# Patient Record
Sex: MALE | Race: WHITE | NOT HISPANIC OR LATINO | URBAN - METROPOLITAN AREA
[De-identification: names, ages, dates, MRNs, and addresses within clinical notes are randomized per-mention and may not be internally consistent; named-entity substitution may affect disease eponyms.]

---

## 2018-05-24 ENCOUNTER — HISTORICAL (OUTPATIENT)
Dept: ADMINISTRATIVE | Facility: HOSPITAL | Age: 68
End: 2018-05-24

## 2018-05-24 LAB
ABS NEUT (OLG): 3.4
ALBUMIN SERPL-MCNC: 4.6 GM/DL (ref 3.4–5)
ALBUMIN/GLOB SERPL: 2.42 {RATIO} (ref 1.5–2.5)
ALP SERPL-CCNC: 86 UNIT/L (ref 38–126)
ALT SERPL-CCNC: 24 UNIT/L (ref 7–52)
APPEARANCE, UA: CLEAR
AST SERPL-CCNC: 21 UNIT/L (ref 15–37)
BACTERIA #/AREA URNS AUTO: NORMAL /HPF
BILIRUB SERPL-MCNC: 0.6 MG/DL (ref 0.2–1)
BILIRUB UR QL STRIP: NEGATIVE MG/DL
BILIRUBIN DIRECT+TOT PNL SERPL-MCNC: 0.1 MG/DL (ref 0–0.5)
BILIRUBIN DIRECT+TOT PNL SERPL-MCNC: 0.5 MG/DL
BUN SERPL-MCNC: 12 MG/DL (ref 7–18)
CALCIUM SERPL-MCNC: 8.9 MG/DL (ref 8.5–10)
CHLORIDE SERPL-SCNC: 103 MMOL/L (ref 98–107)
CHOLEST SERPL-MCNC: 227 MG/DL (ref 0–200)
CHOLEST/HDLC SERPL: 3.4 {RATIO}
CO2 SERPL-SCNC: 30 MMOL/L (ref 21–32)
COLOR UR: YELLOW
CREAT SERPL-MCNC: 0.95 MG/DL (ref 0.6–1.3)
ERYTHROCYTE [DISTWIDTH] IN BLOOD BY AUTOMATED COUNT: 13.4 % (ref 11.5–17)
GLOBULIN SER-MCNC: 2 GM/DL (ref 1.2–3)
GLUCOSE (UA): NEGATIVE MG/DL
GLUCOSE SERPL-MCNC: 108 MG/DL (ref 74–106)
HCT VFR BLD AUTO: 44.6 % (ref 42–52)
HDLC SERPL-MCNC: 66 MG/DL (ref 35–60)
HGB BLD-MCNC: 14.3 GM/DL (ref 14–18)
HGB UR QL STRIP: NEGATIVE UNIT/L
KETONES UR QL STRIP: NEGATIVE MG/DL
LDLC SERPL CALC-MCNC: 129 MG/DL (ref 0–129)
LEUKOCYTE ESTERASE UR QL STRIP: NEGATIVE UNIT/L
LYMPHOCYTES # BLD AUTO: 3.3 X10(3)/MCL (ref 0.6–3.4)
LYMPHOCYTES NFR BLD AUTO: 46 % (ref 13–40)
MCH RBC QN AUTO: 28.6 PG (ref 27–31.2)
MCHC RBC AUTO-ENTMCNC: 32 GM/DL (ref 32–36)
MCV RBC AUTO: 89 FL (ref 80–94)
MONOCYTES # BLD AUTO: 0.4 X10(3)/MCL (ref 0–1.8)
MONOCYTES NFR BLD AUTO: 5 % (ref 0.1–24)
NEUTROPHILS NFR BLD AUTO: 49 % (ref 47–80)
NITRITE UR QL STRIP.AUTO: NEGATIVE
PH UR STRIP: 7 [PH]
PLATELET # BLD AUTO: 205 X10(3)/MCL (ref 130–400)
PMV BLD AUTO: 11.1 FL
POTASSIUM SERPL-SCNC: 4.9 MMOL/L (ref 3.5–5.1)
PROT SERPL-MCNC: 6.5 GM/DL (ref 6.4–8.2)
PROT UR QL STRIP: NEGATIVE MG/DL
PSA SERPL-MCNC: 2.8 NG/ML (ref 0–4.5)
RBC # BLD AUTO: 5 X10(6)/MCL (ref 4.7–6.1)
RBC #/AREA URNS HPF: NORMAL /HPF
SODIUM SERPL-SCNC: 139 MMOL/L (ref 136–145)
SP GR UR STRIP: 1.01
SQUAMOUS EPITHELIAL, UA: NORMAL /LPF
TRIGL SERPL-MCNC: 81 MG/DL (ref 30–150)
UROBILINOGEN UR STRIP-ACNC: 0.2 MG/DL
VLDLC SERPL CALC-MCNC: 16.2 MG/DL
WBC # SPEC AUTO: 7.1 X10(3)/MCL (ref 4.5–11.5)
WBC #/AREA URNS AUTO: NORMAL /[HPF]

## 2019-07-16 ENCOUNTER — HISTORICAL (OUTPATIENT)
Dept: ADMINISTRATIVE | Facility: HOSPITAL | Age: 69
End: 2019-07-16

## 2019-07-16 LAB
ABS NEUT (OLG): 4.1 X10(3)/MCL (ref 2.1–9.2)
ALBUMIN SERPL-MCNC: 4.2 GM/DL (ref 3.4–5)
ALBUMIN/GLOB SERPL: 2.33 {RATIO} (ref 1.5–2.5)
ALP SERPL-CCNC: 69 UNIT/L (ref 38–126)
ALT SERPL-CCNC: 29 UNIT/L (ref 7–52)
APPEARANCE, UA: CLEAR
AST SERPL-CCNC: 22 UNIT/L (ref 15–37)
BACTERIA #/AREA URNS AUTO: NORMAL /HPF
BILIRUB SERPL-MCNC: 0.5 MG/DL (ref 0.2–1)
BILIRUB UR QL STRIP: NEGATIVE MG/DL
BILIRUBIN DIRECT+TOT PNL SERPL-MCNC: 0.1 MG/DL (ref 0–0.5)
BILIRUBIN DIRECT+TOT PNL SERPL-MCNC: 0.4 MG/DL
BUN SERPL-MCNC: 16 MG/DL (ref 7–18)
CALCIUM SERPL-MCNC: 8.8 MG/DL (ref 8.5–10)
CHLORIDE SERPL-SCNC: 103 MMOL/L (ref 98–107)
CHOLEST SERPL-MCNC: 209 MG/DL (ref 0–200)
CHOLEST/HDLC SERPL: 4.3 {RATIO}
CO2 SERPL-SCNC: 29 MMOL/L (ref 21–32)
COLOR UR: YELLOW
CREAT SERPL-MCNC: 1.09 MG/DL (ref 0.6–1.3)
ERYTHROCYTE [DISTWIDTH] IN BLOOD BY AUTOMATED COUNT: 14.2 % (ref 11.5–17)
GLOBULIN SER-MCNC: 1.8 GM/DL (ref 1.2–3)
GLUCOSE (UA): NEGATIVE MG/DL
GLUCOSE SERPL-MCNC: 107 MG/DL (ref 74–106)
HCT VFR BLD AUTO: 48.3 % (ref 42–52)
HDLC SERPL-MCNC: 49 MG/DL (ref 35–60)
HGB BLD-MCNC: 15.7 GM/DL (ref 14–18)
HGB UR QL STRIP: NEGATIVE UNIT/L
KETONES UR QL STRIP: NORMAL MG/DL
LDLC SERPL CALC-MCNC: 133 MG/DL (ref 0–129)
LEUKOCYTE ESTERASE UR QL STRIP: NEGATIVE UNIT/L
LYMPHOCYTES # BLD AUTO: 3.9 X10(3)/MCL (ref 0.6–3.4)
LYMPHOCYTES NFR BLD AUTO: 44.3 % (ref 13–40)
MCH RBC QN AUTO: 28.4 PG (ref 27–31.2)
MCHC RBC AUTO-ENTMCNC: 32 GM/DL (ref 32–36)
MCV RBC AUTO: 88 FL (ref 80–94)
MONOCYTES # BLD AUTO: 0.7 X10(3)/MCL (ref 0.1–1.3)
MONOCYTES NFR BLD AUTO: 8.2 % (ref 0.1–24)
NEUTROPHILS NFR BLD AUTO: 47.5 % (ref 47–80)
NITRITE UR QL STRIP.AUTO: NEGATIVE
PH UR STRIP: 6 [PH]
PLATELET # BLD AUTO: 196 X10(3)/MCL (ref 130–400)
PMV BLD AUTO: 10.6 FL (ref 9.4–12.4)
POTASSIUM SERPL-SCNC: 4.9 MMOL/L (ref 3.5–5.1)
PROT SERPL-MCNC: 6 GM/DL (ref 6.4–8.2)
PROT UR QL STRIP: NEGATIVE MG/DL
PSA SERPL-MCNC: 3.52 NG/ML (ref 0–6.5)
RBC # BLD AUTO: 5.52 X10(6)/MCL (ref 4.7–6.1)
RBC #/AREA URNS HPF: NORMAL /HPF
SODIUM SERPL-SCNC: 137 MMOL/L (ref 136–145)
SP GR UR STRIP: 1.01
SQUAMOUS EPITHELIAL, UA: NORMAL /LPF
TRIGL SERPL-MCNC: 61 MG/DL (ref 30–150)
UROBILINOGEN UR STRIP-ACNC: 0.2 MG/DL
VLDLC SERPL CALC-MCNC: 12.2 MG/DL
WBC # SPEC AUTO: 8.7 X10(3)/MCL (ref 4.5–11.5)
WBC #/AREA URNS AUTO: NORMAL /[HPF]

## 2020-07-07 ENCOUNTER — HISTORICAL (OUTPATIENT)
Dept: ADMINISTRATIVE | Facility: HOSPITAL | Age: 70
End: 2020-07-07

## 2020-07-07 LAB
ABS NEUT (OLG): 4 X10(3)/MCL (ref 2.1–9.2)
ALBUMIN SERPL-MCNC: 4.4 GM/DL (ref 3.4–5)
ALBUMIN/GLOB SERPL: 2.32 {RATIO} (ref 1.5–2.5)
ALP SERPL-CCNC: 65 UNIT/L (ref 38–126)
ALT SERPL-CCNC: 20 UNIT/L (ref 7–52)
APPEARANCE, UA: CLEAR
AST SERPL-CCNC: 22 UNIT/L (ref 15–37)
BACTERIA #/AREA URNS AUTO: ABNORMAL /HPF
BILIRUB SERPL-MCNC: 0.5 MG/DL (ref 0.2–1)
BILIRUB UR QL STRIP: NEGATIVE MG/DL
BILIRUBIN DIRECT+TOT PNL SERPL-MCNC: 0.1 MG/DL (ref 0–0.5)
BILIRUBIN DIRECT+TOT PNL SERPL-MCNC: 0.4 MG/DL
BUN SERPL-MCNC: 14 MG/DL (ref 7–18)
CALCIUM SERPL-MCNC: 9.6 MG/DL (ref 8.5–10)
CHLORIDE SERPL-SCNC: 102 MMOL/L (ref 98–107)
CHOLEST SERPL-MCNC: 223 MG/DL (ref 0–200)
CHOLEST/HDLC SERPL: 3.9 {RATIO}
CO2 SERPL-SCNC: 29 MMOL/L (ref 21–32)
COLOR UR: YELLOW
CREAT SERPL-MCNC: 1.08 MG/DL (ref 0.6–1.3)
ERYTHROCYTE [DISTWIDTH] IN BLOOD BY AUTOMATED COUNT: 13.8 % (ref 11.5–17)
GLOBULIN SER-MCNC: 1.9 GM/DL (ref 1.2–3)
GLUCOSE (UA): NEGATIVE MG/DL
GLUCOSE SERPL-MCNC: 100 MG/DL (ref 74–106)
HCT VFR BLD AUTO: 48.2 % (ref 42–52)
HDLC SERPL-MCNC: 57 MG/DL (ref 35–60)
HGB BLD-MCNC: 15.6 GM/DL (ref 14–18)
HGB UR QL STRIP: NEGATIVE UNIT/L
KETONES UR QL STRIP: NEGATIVE MG/DL
LDLC SERPL CALC-MCNC: 133 MG/DL (ref 0–129)
LEUKOCYTE ESTERASE UR QL STRIP: NEGATIVE UNIT/L
LYMPHOCYTES # BLD AUTO: 3.6 X10(3)/MCL (ref 0.6–3.4)
LYMPHOCYTES NFR BLD AUTO: 44 % (ref 13–40)
MCH RBC QN AUTO: 28.2 PG (ref 27–31.2)
MCHC RBC AUTO-ENTMCNC: 32 GM/DL (ref 32–36)
MCV RBC AUTO: 87 FL (ref 80–94)
MONOCYTES # BLD AUTO: 0.5 X10(3)/MCL (ref 0.1–1.3)
MONOCYTES NFR BLD AUTO: 6 % (ref 0.1–24)
NEUTROPHILS NFR BLD AUTO: 50 % (ref 47–80)
NITRITE UR QL STRIP.AUTO: NEGATIVE
PH UR STRIP: 7 [PH]
PLATELET # BLD AUTO: 207 X10(3)/MCL (ref 130–400)
PMV BLD AUTO: 10.4 FL (ref 9.4–12.4)
POTASSIUM SERPL-SCNC: 5.3 MMOL/L (ref 3.5–5.1)
PROT SERPL-MCNC: 6.3 GM/DL (ref 6.4–8.2)
PROT UR QL STRIP: ABNORMAL MG/DL
PSA SERPL-MCNC: 5.48 NG/ML (ref 0–6.5)
RBC # BLD AUTO: 5.53 X10(6)/MCL (ref 4.7–6.1)
RBC #/AREA URNS HPF: ABNORMAL /HPF
SODIUM SERPL-SCNC: 139 MMOL/L (ref 136–145)
SP GR UR STRIP: 1.02
SQUAMOUS EPITHELIAL, UA: ABNORMAL /LPF
T3FREE SERPL-MCNC: 3.41 PG/ML (ref 1.45–3.48)
T4 FREE SERPL-MCNC: 0.94 NG/DL (ref 0.76–1.46)
TRIGL SERPL-MCNC: 69 MG/DL (ref 30–150)
TSH SERPL-ACNC: 3.8 MIU/ML (ref 0.35–4.94)
UROBILINOGEN UR STRIP-ACNC: 0.2 MG/DL
VLDLC SERPL CALC-MCNC: 13.8 MG/DL
WBC # SPEC AUTO: 8.1 X10(3)/MCL (ref 4.5–11.5)
WBC #/AREA URNS AUTO: ABNORMAL /[HPF]

## 2020-10-15 ENCOUNTER — HISTORICAL (OUTPATIENT)
Dept: ADMINISTRATIVE | Facility: HOSPITAL | Age: 70
End: 2020-10-15

## 2020-10-15 LAB
APPEARANCE, UA: CLEAR
BACTERIA #/AREA URNS AUTO: ABNORMAL /HPF
BILIRUB UR QL STRIP: NEGATIVE MG/DL
BUN SERPL-MCNC: 20 MG/DL (ref 7–18)
CALCIUM SERPL-MCNC: 9.9 MG/DL (ref 8.5–10.1)
CHLORIDE SERPL-SCNC: 102 MMOL/L (ref 98–107)
CO2 SERPL-SCNC: 29 MMOL/L (ref 21–32)
COLOR UR: YELLOW
CREAT SERPL-MCNC: 1.13 MG/DL (ref 0.6–1.3)
CREAT UR-MCNC: 276.1 MG/DL (ref 58–161)
CREAT/UREA NIT SERPL: 17.7
GLUCOSE (UA): NEGATIVE MG/DL
GLUCOSE SERPL-MCNC: 95 MG/DL (ref 74–106)
HGB UR QL STRIP: NEGATIVE UNIT/L
KETONES UR QL STRIP: ABNORMAL MG/DL
LEUKOCYTE ESTERASE UR QL STRIP: NEGATIVE UNIT/L
NITRITE UR QL STRIP.AUTO: NEGATIVE
PH UR STRIP: 5.5 [PH]
POTASSIUM SERPL-SCNC: 6.4 MMOL/L (ref 3.5–5.1)
PROT UR QL STRIP: ABNORMAL MG/DL
PROT UR STRIP-MCNC: 22.5 MG/DL
PROT/CREAT UR-RTO: 0 MG/DL
PSA SERPL-MCNC: 3.43 NG/ML (ref 0–6.5)
RBC #/AREA URNS HPF: ABNORMAL /HPF
SODIUM SERPL-SCNC: 140 MMOL/L (ref 136–145)
SP GR UR STRIP: 1.02
SQUAMOUS EPITHELIAL, UA: ABNORMAL /LPF
UROBILINOGEN UR STRIP-ACNC: 0.2 MG/DL
WBC #/AREA URNS AUTO: ABNORMAL /[HPF]

## 2020-10-28 ENCOUNTER — HISTORICAL (OUTPATIENT)
Dept: ADMINISTRATIVE | Facility: HOSPITAL | Age: 70
End: 2020-10-28

## 2020-10-28 LAB — POTASSIUM SERPL-SCNC: 5.6 MMOL/L (ref 3.5–5.1)

## 2021-07-16 ENCOUNTER — HISTORICAL (OUTPATIENT)
Dept: ADMINISTRATIVE | Facility: HOSPITAL | Age: 71
End: 2021-07-16

## 2021-07-16 LAB
ABS NEUT (OLG): 4.5 X10(3)/MCL (ref 2.1–9.2)
ALBUMIN SERPL-MCNC: 4.3 GM/DL (ref 3.4–5)
ALBUMIN/GLOB SERPL: 2.39 {RATIO} (ref 1.5–2.5)
ALP SERPL-CCNC: 73 UNIT/L (ref 38–126)
ALT SERPL-CCNC: 17 UNIT/L (ref 7–52)
APPEARANCE, UA: CLEAR
AST SERPL-CCNC: 18 UNIT/L (ref 15–37)
BACTERIA #/AREA URNS AUTO: ABNORMAL /HPF
BILIRUB SERPL-MCNC: 0.5 MG/DL (ref 0.2–1)
BILIRUB UR QL STRIP: NEGATIVE MG/DL
BILIRUBIN DIRECT+TOT PNL SERPL-MCNC: 0.1 MG/DL (ref 0–0.5)
BILIRUBIN DIRECT+TOT PNL SERPL-MCNC: 0.4 MG/DL
BUN SERPL-MCNC: 14 MG/DL (ref 7–18)
CALCIUM SERPL-MCNC: 9.2 MG/DL (ref 8.5–10.1)
CHLORIDE SERPL-SCNC: 100 MMOL/L (ref 98–107)
CHOLEST SERPL-MCNC: 163 MG/DL (ref 0–200)
CHOLEST/HDLC SERPL: 3.1 {RATIO}
CO2 SERPL-SCNC: 31 MMOL/L (ref 21–32)
COLOR UR: YELLOW
CREAT SERPL-MCNC: 0.95 MG/DL (ref 0.6–1.3)
ERYTHROCYTE [DISTWIDTH] IN BLOOD BY AUTOMATED COUNT: 13.9 % (ref 11.5–17)
GLOBULIN SER-MCNC: 1.8 GM/DL (ref 1.2–3)
GLUCOSE (UA): NEGATIVE MG/DL
GLUCOSE SERPL-MCNC: 91 MG/DL (ref 74–106)
HCT VFR BLD AUTO: 46.1 % (ref 42–52)
HDLC SERPL-MCNC: 52 MG/DL (ref 35–60)
HGB BLD-MCNC: 14.6 GM/DL (ref 14–18)
HGB UR QL STRIP: NEGATIVE UNIT/L
KETONES UR QL STRIP: ABNORMAL MG/DL
LDLC SERPL CALC-MCNC: 97 MG/DL (ref 0–129)
LEUKOCYTE ESTERASE UR QL STRIP: NEGATIVE UNIT/L
LYMPHOCYTES # BLD AUTO: 3.9 X10(3)/MCL (ref 0.6–3.4)
LYMPHOCYTES NFR BLD AUTO: 44.5 % (ref 13–40)
MCH RBC QN AUTO: 28.2 PG (ref 27–31.2)
MCHC RBC AUTO-ENTMCNC: 32 GM/DL (ref 32–36)
MCV RBC AUTO: 89 FL (ref 80–94)
MONOCYTES # BLD AUTO: 0.3 X10(3)/MCL (ref 0.1–1.3)
MONOCYTES NFR BLD AUTO: 3 % (ref 0.1–24)
NEUTROPHILS NFR BLD AUTO: 52.5 % (ref 47–80)
NITRITE UR QL STRIP.AUTO: NEGATIVE
PH UR STRIP: 6 [PH]
PLATELET # BLD AUTO: 206 X10(3)/MCL (ref 130–400)
PMV BLD AUTO: 11.2 FL (ref 9.4–12.4)
POTASSIUM SERPL-SCNC: 4.8 MMOL/L (ref 3.5–5.1)
PROT SERPL-MCNC: 6.1 GM/DL (ref 6.4–8.2)
PROT UR QL STRIP: NEGATIVE MG/DL
PSA SERPL-MCNC: 4.89 NG/ML (ref 0–6.5)
RBC # BLD AUTO: 5.18 X10(6)/MCL (ref 4.7–6.1)
RBC #/AREA URNS HPF: ABNORMAL /HPF
SODIUM SERPL-SCNC: 137 MMOL/L (ref 136–145)
SP GR UR STRIP: 1.02
SQUAMOUS EPITHELIAL, UA: ABNORMAL /LPF
TRIGL SERPL-MCNC: 58 MG/DL (ref 30–150)
UROBILINOGEN UR STRIP-ACNC: 0.2 MG/DL
VLDLC SERPL CALC-MCNC: 11.6 MG/DL
WBC # SPEC AUTO: 8.7 X10(3)/MCL (ref 4.5–11.5)
WBC #/AREA URNS AUTO: ABNORMAL /[HPF]

## 2021-07-20 LAB — EST CREAT CLEARANCE SER (OHS): 63.33 ML/MIN

## 2022-01-12 ENCOUNTER — HISTORICAL (OUTPATIENT)
Dept: ADMINISTRATIVE | Facility: HOSPITAL | Age: 72
End: 2022-01-12

## 2022-01-12 LAB
CHOLEST SERPL-MCNC: 238 MG/DL (ref 0–200)
CHOLEST/HDLC SERPL: 3.2 {RATIO}
HDLC SERPL-MCNC: 75 MG/DL (ref 35–60)
LDLC SERPL CALC-MCNC: 129 MG/DL (ref 0–129)
PSA SERPL-MCNC: 3.42 NG/ML (ref 0–6.5)
TRIGL SERPL-MCNC: 70 MG/DL (ref 30–150)
VLDLC SERPL CALC-MCNC: 14 MG/DL

## 2022-01-20 ENCOUNTER — HISTORICAL (OUTPATIENT)
Dept: ADMINISTRATIVE | Facility: HOSPITAL | Age: 72
End: 2022-01-20

## 2022-01-20 LAB
ABS NEUT (OLG): 5.1 X10(3)/MCL (ref 2.1–9.2)
CRP SERPL-MCNC: <0.1 MG/DL
ERYTHROCYTE [DISTWIDTH] IN BLOOD BY AUTOMATED COUNT: 14.3 % (ref 11.5–17)
ERYTHROCYTE [SEDIMENTATION RATE] IN BLOOD: 2 MM/HR (ref 0–15)
HCT VFR BLD AUTO: 42 % (ref 42–52)
HGB BLD-MCNC: 13.5 GM/DL (ref 14–18)
LYMPHOCYTES # BLD AUTO: 3.8 X10(3)/MCL (ref 0.6–3.4)
LYMPHOCYTES NFR BLD AUTO: 40.9 % (ref 13–40)
MCH RBC QN AUTO: 28.4 PG (ref 27–31.2)
MCHC RBC AUTO-ENTMCNC: 32 GM/DL (ref 32–36)
MCV RBC AUTO: 88 FL (ref 80–94)
MONOCYTES # BLD AUTO: 0.5 X10(3)/MCL (ref 0.1–1.3)
MONOCYTES NFR BLD AUTO: 5.8 % (ref 0.1–24)
NEUTROPHILS NFR BLD AUTO: 53.3 % (ref 47–80)
PLATELET # BLD AUTO: 229 X10(3)/MCL (ref 130–400)
PMV BLD AUTO: 10.3 FL (ref 9.4–12.4)
RBC # BLD AUTO: 4.76 X10(6)/MCL (ref 4.7–6.1)
WBC # SPEC AUTO: 9.4 X10(3)/MCL (ref 4.5–11.5)

## 2022-04-10 ENCOUNTER — HISTORICAL (OUTPATIENT)
Dept: ADMINISTRATIVE | Facility: HOSPITAL | Age: 72
End: 2022-04-10

## 2022-04-26 VITALS
BODY MASS INDEX: 19.98 KG/M2 | WEIGHT: 147.5 LBS | OXYGEN SATURATION: 96 % | DIASTOLIC BLOOD PRESSURE: 68 MMHG | SYSTOLIC BLOOD PRESSURE: 118 MMHG | HEIGHT: 72 IN

## 2022-05-02 NOTE — HISTORICAL [facility]
This is a historical note converted from [place]. Formatting and pictures may have been removed.  Please reference [place] for original formatting and attached multimedia. Chief Complaint  See scanned document titled: [facility] Recommendations  History of Present Illness  Patient presents for?work-up requested by?Dorinda?Deanna?at Dr Rhodes. Pt was diagnosed with retinal artery branch occlusion.  He went to the eye doctor for his normal check up. Otherwise his eye is healthy. He some loss of vision over his superior peripheral vision.  No headaches, dizziness or lightheadedness.  He denies chest pain/pressure/tightness/dyspnea.  He has been in his normal state health.  ?  His wife did a scan today and is cancer free.  Review of Systems  See HPI.  Physical Exam  Vitals & Measurements  T:?36.6? ?C (Oral)? HR:?79(Peripheral)? BP:?118/68? SpO2:?96%?  HT:?182.00?cm? WT:?66.900?kg? BMI:?20.2?  General: He is a well-developed well-nourished pleasant white male in no apparent distress. ?He is alert and oriented.  Chest: Clear to auscultation bilaterally.  CV: Regular rate and rhythm without murmurs rubs or gallops.  Neck: Supple,?no carotid bruits noted.  Assessment/Plan  1.?Retinal artery occlusion, branch?H34.239  ?Per Dr. Huerta recommendation?for work-up of this condition, we will order a?CBC sed rate?and C-reactive protein.  Recent lipid profile and PSA reviewed with patient.  Schedule?carotid Doppler.  Schedule MRI brain/orbits with and without.  Ordered:  Automated Diff, Routine collect, 01/20/22 15:47:00 CST, Blood, Collected, Stop date 01/20/22 15:47:00 CST, Lab Collect, Retinal artery occlusion, branch, 01/20/22 15:47:00 CST  CBC w/ Auto Diff, Routine collect, 01/20/22 15:47:00 CST, Blood, Stop date 01/20/22 15:47:00 CST, Lab Collect, Retinal artery occlusion, branch, 01/20/22 15:47:00 CST  Clinic Follow-up PRN, 01/20/22 15:42:00 CST, [facility], Future Order  CRP, Routine collect, 01/20/22 15:33:00  CST, Blood, Order for future visit, Stop date 01/20/22 15:33:00 CST, Lab Collect, Retinal artery occlusion, branch, 01/20/22 15:33:00 CST  Office/Outpatient Visit Level 3 Established [id number] PC, Retinal artery occlusion, branch, [facility], 01/20/22 15:41:00 CST  Schedule Diagnostics Study, Bilateral carotid Doppler, Next available. Please schedule at same time as MRI, 01/20/22 15:48:00 CST, Retinal artery occlusion, branch  Schedule Diagnostics Study, MRI brain/orbits with and without, Next available. Schedule at same time as carotid Doppler., 01/20/22 15:48:00 CST, Retinal artery occlusion, branch  Sedimentation Rate, Routine collect, 01/20/22 15:33:00 CST, Blood, Order for future visit, Stop date 01/20/22 15:33:00 CST, Lab Collect, Retinal artery occlusion, branch, 01/20/22 15:33:00 CST  ?  Referrals  Clinic Follow-up PRN, 01/20/22 15:42:00 CST, [facility], Future Order   Problem List/Past Medical History  Ongoing  Asthma  Chronic anemia  Dupuytrens contracture of left hand  Historical  Asthma  Mild chronic anemia  RBBB - Right bundle branch block  Procedure/Surgical History  Colonoscopy (2016)  Bunion  Bunionectomy  Dupluytrens Contracture  Dupuytren operation  Repair of umbilical hernia  Unbilical Hernia  Vascetomy  Vasectomy   Medications  No active medications  Allergies  No Known Allergies  Social History  Abuse/Neglect  No, 07/16/2020  Alcohol  Never, 05/14/2018  Employment/School  Retired, 05/14/2018  Exercise  Exercise type: Yardwork., 05/14/2018  Home/Environment  Lives with Spouse., 05/14/2018  Nutrition/Health  Low carbohydrate, Intermittent Fasting, 07/16/2020  Substance Use  Never, 05/30/2018  Tobacco  Never (less than 100 in lifetime), N/A, 07/16/2020  Family History  Coronary artery disease: Father.  Heart murmur.: Brother.  Hypercholesterolaemia: Mother.  Hypertension.: Brother.  Primary malignant neoplasm of breast: Mother.  Primary malignant neoplasm of prostate: Father and  Brother.  Stroke: Father.  Thyroid disease: Mother and Brother.  Immunizations  Vaccine Date Status   tetanus/diphtheria/pertussis, acel(Tdap) 07/20/2021 Given   influenza virus vaccine, inactivated 12/05/2016 Recorded   pneumococcal 23-polyvalent vaccine 05/24/2016 Recorded   pneumococcal 23-polyvalent vaccine 05/24/2016 Recorded   pneumococcal 13-valent conjugate vaccine 05/15/2014 Recorded   pneumococcal 13-valent conjugate vaccine 05/15/2014 Recorded   zoster vaccine live 03/16/2012 Recorded   zoster vaccine live 03/06/2012 Recorded   tetanus/diphtheria/pertussis, acel(Tdap) 07/31/2007 Recorded   diphtheria/pertussis, acellular/tetanus 07/31/2007 Recorded   pneumococcal 23-polyvalent vaccine 2006 Recorded   Health Maintenance  Health Maintenance  ???Pending?(in the next year)  ??? ??OverDue  ??? ? ? ?Influenza Vaccine due??10/01/21??and every 1??day(s)  ??? ? ? ?Advance Directive due??01/02/22??and every 1??year(s)  ??? ? ? ?Cognitive Screening due??01/02/22??and every 1??year(s)  ??? ? ? ?Fall Risk Assessment due??01/02/22??and every 1??year(s)  ??? ? ? ?Functional Assessment due??01/02/22??and every 1??year(s)  ??? ??Due?  ??? ? ? ?Alcohol Misuse Screening due??01/02/22??and every 1??year(s)  ??? ? ? ?ADL Screening due??01/20/22??and every 1??year(s)  ??? ? ? ?Aspirin Therapy for CVD Prevention due??01/20/22??and every 1??year(s)  ??? ? ? ?Asthma Management-Asthma Education due??01/20/22??and every 6??month(s)  ??? ? ? ?Asthma Management-Spirometry due??01/20/22??Variable frequency  ??? ? ? ?Asthma Management-[place] Flow due??01/20/22??Variable frequency  ??? ? ? ?Asthma Management-Written Action Plan due??01/20/22??and every 6??month(s)  ??? ? ? ?Depression Screening due??01/20/22??Unknown Frequency  ??? ? ? ?Zoster Vaccine due??01/20/22??Unknown Frequency  ??? ??Due In Future?  ??? ? ? ?Medicare Annual Wellness Exam not due until??07/20/22??and every 1??year(s)  ??? ? ? ?Obesity Screening not due  until??01/01/23??and every 1??year(s)  ???Satisfied?(in the past 1 year)  ??? ??Satisfied?  ??? ? ? ?Blood Pressure Screening on??01/20/22.??Satisfied by Estelita Gibbs LPN  ??? ? ? ?Body Mass Index Check on??01/20/22.??Satisfied by Estelita Gibbs LPN  ??? ? ? ?Diabetes Screening on??07/16/21.??Satisfied by Donnie Bates  ??? ? ? ?Influenza Vaccine on??01/20/22.??Satisfied by Estelita Gibbs LPN  ??? ? ? ?Lipid Screening on??01/12/22.??Satisfied by Donnie Bates  ??? ? ? ?Medicare Annual Wellness Exam on??07/20/21.??Satisfied by Miki Cadena MD  ??? ? ? ?Obesity Screening on??01/20/22.??Satisfied by Estelita Gibbs LPN  ??? ? ? ?Tetanus Vaccine on??07/20/21.??Satisfied by Dejan Nichole LPN  ?

## 2022-05-02 NOTE — HISTORICAL [facility]
This is a historical note converted from [place]. Formatting and pictures may have been removed.  Please reference [place] for original formatting and attached multimedia. Chief Complaint  Annual wellness physical- NPO @ time of labwork.  History of Present Illness  Patient presents for wellness examination.  He has been feeling very well.  He has been sleeping very well.  His appetite is good; he intermittent fasts. He eats more fats. He eats greens, nuts, eggs and cheeses. He has been eating less meat.  He does yard work and is active.  He is retired and lives with his spouse. His?spouse has a recurrence of her?Breast Cancer. She had a bilateral mastectomy and chemotherapy in 2014.? 3 children and 5 grandchildren.  He does not smoke or drink.  He has?1-2 cups of coffee a day. No soft drinks.  Colonoscopy 2016: Dr Boyd.  His father and brother both had prostate cancer.  Review of Systems  Constitutional:? no?weight gain,??no?weight loss,??no?fatigue, ?no?fever, ?no?chills, ?no?weakness, ?no?trouble sleeping  Eyes: ?no?vision loss/changes,??+?glasses,??no?pain,??no?redness,??no?blurry or double vision,??no?flashing lights,??no?glaucoma,??no?cataracts  Last eye exam:? yearly  Neck: ?no?lymphadenopathy,??no?thyroid abnormalities,??no?bruits,??no?stiffness  Ears:??no?decreased hearing,??no?tinnitus,??no?earache,??no?drainage?  Nose:??no?congestion,??no?rhinorrhea,??no?epistaxis,??no?sinus pressure  Throat/Oral:??no?sore throat,??no?hoarseness, ?no?dental caries,??no?gum bleeding,??no?oral lesions  Cardiovascular:??no?chest pain, palpitations, or tightness,??no?dyspnea with exertion,??no?orthopnea,??no?paroxysmal nocturnal dyspnea  Respiratory:??no?cough,??no?sputum,??no?hemoptysis,??no?dyspnea,??no?wheezing,? + asthma,?no?pleuritic chest pain?  Gastrointestinal:??no?abdominal pain,??no?nausea,??no?vomiting,??no?heartburn,??no?dysphagia or odynophagia,??no?diarrhea,??no?constipation,?his stool are float,  ?no?melena,??no?hematochezia,?no?jaundice  Urinary:??no?frequency,??no?urgency,??no?burning or pain,?no?hematuria,??no?incontinence,??no?hesitancy,??no?incomplete voiding,??no?flank pain,??no?nocturia  Musculoskeletal:?no?myalgias,??no?arthralgias,?no?neck pain,??no?back pain,??no?swelling of extremities, + Dupuytrens contracture left hand: much improved  Skin:?no?rashes,??no?sores,??no?non-healing wounds, abrasion mid forehead  Neurologic:??no?headaches,??no?dizziness/lightheadedness,??no?tremors,??no?paresthesias,??no?seizures,??no?muscle weakness  Psychiatric:??no?depression/sadness,??no?anhedonia,??no?irritability,??no?suicidal ideations,??no?anxiety,??no?panic attacks  Endocrine:??no?hot or cold intolerance,??no?sweating,??no?polyuria,??no?polydipsia,??no?polyphagia  Hematologic:??no?bruising,??no?bleeding disorders, + anemia  Physical Exam  Vitals & Measurements  T:?36.5? ?C (Oral)? HR:?77(Peripheral)? BP:?122/60? SpO2:?98%?  HT:?182.00?cm? WT:?63.700?kg? BMI:?19.23?  ?  GENERAL: The patient is a well-developed, well-nourished white male in no?apparent distress. He is alert and oriented x 4.  HEENT: Head is normocephalic and atraumatic. Extraocular muscles are intact. Pupils are equal, round, and reactive to light and accommodation. Nares appeared normal. Mouth is well hydrated and without lesions. Mucous membranes are moist. Posterior pharynx clear of any exudate or lesions.  NECK: Supple. No carotid bruits.? No lymphadenopathy or thyromegaly.  LUNGS: Clear to auscultation.  HEART: Regular rate and rhythm without murmur, gallops or rubs.  ABDOMEN: Soft, nontender, and nondistended.? Positive bowel sounds.? No hepatosplenomegaly was noted.  EXTREMITIES: Without any cyanosis, clubbing, rash, lesions or edema.  NEUROLOGIC: Cranial nerves II through XII are grossly intact.? No motor or sensory deficits.? Cerebellar function intact.  SKIN: No ulceration or induration present.  :? Normal male genitalia, no  hernias,?testes?descended, soft and atrophied.??NST,??prostate firm, smooth, prominent ridges bilaterally?with?slightly?raised area?mid right lobe.?  ?  ?  Assessment/Plan  1.?Wellness examination?Z00.00  ?Patient presents for wellness examination.  He has been feeling well.  He is without complaints or concerns.  Labs reviewed with patient.? His PSA is currently 4.89?last year was 3.53. ?His prostate exam?reveals bilateral ridging?with a slight raised area?mid right?prostate?area.  I?advised patient I would like to recheck his PSA in 6 months; patient is in agreement.? Order placed in chart.  Ordered:  Medicare Annual Wellness- Subsequent  PC, Wellness examination  Chronic anemia  Asthma  Immunization due  Family history of heart disease, [facility], 07/20/21 12:17:00 CDT  ?  2.?Chronic anemia?D64.9  ?CBC is within normal limits.  Ordered:  Medicare Annual Wellness- Subsequent  PC, Wellness examination  Chronic anemia  Asthma  Immunization due  Family history of heart disease, [facility], 07/20/21 12:17:00 CDT  ?  3.?Asthma?J45.909  Well-controlled; no medications?required.  Ordered:  Medicare Annual Wellness- Subsequent  PC, Wellness examination  Chronic anemia  Asthma  Immunization due  Family history of heart disease, [facility], 07/20/21 12:17:00 CDT  ?  4.?Immunization due?Z23  Tdap?0.5 IM administered; benefits/potential risks/side effects discussed with patient  Ordered:  Medicare Annual Wellness- Subsequent  PC, Wellness examination  Chronic anemia  Asthma  Immunization due  Family history of heart disease, [facility], 07/20/21 12:17:00 CDT  ?  5.?Family history of heart disease?Z82.49  His EKG reveals?normal sinus rhythm?with?left axis?deviation?and an incomplete right bundle branch block.  Ordered:  Medicare Annual Wellness- Subsequent  PC, Wellness examination  Chronic anemia  Asthma  Immunization due  Family history of heart disease, [facility]  [facility] - [facility], 07/20/21 12:17:00 CDT  ?  Orders:  Prostate Specific Antigen, Routine collect, *Est. 01/20/22 3:00:00 CST, Blood, Order for future visit, *Est. Stop date 01/20/22 3:00:00 CST, Lab Collect, Elevated PSA, 07/20/21 10:16:00 CDT  Referrals  Clinic Follow up, Order for future visit   Problem List/Past Medical History  Ongoing  Asthma  Chronic anemia  Dupuytrens contracture of left hand  Historical  Asthma  Mild chronic anemia  RBBB - Right bundle branch block  Procedure/Surgical History  Colonoscopy (2016)  Bunion  Bunionectomy  Dupluytrens Contracture  Dupuytren operation  Repair of umbilical hernia  Unbilical Hernia  Vascetomy  Vasectomy   Medications  No active medications  Allergies  No Known Allergies  Social History  Abuse/Neglect  No, 07/16/2020  Alcohol  Never, 05/14/2018  Employment/School  Retired, 05/14/2018  Exercise  Exercise type: Yardwork., 05/14/2018  Home/Environment  Lives with Spouse., 05/14/2018  Nutrition/Health  Low carbohydrate, Intermittent Fasting, 07/16/2020  Substance Use  Never, 05/30/2018  Tobacco  Never (less than 100 in lifetime), N/A, 07/16/2020  Family History  Coronary artery disease: Father.  Heart murmur.: Brother.  Hypercholesterolaemia: Mother.  Hypertension.: Brother.  Primary malignant neoplasm of breast: Mother.  Primary malignant neoplasm of prostate: Father and Brother.  Stroke: Father.  Thyroid disease: Mother and Brother.  Immunizations  Vaccine Date Status   tetanus/diphtheria/pertussis, acel(Tdap) 07/20/2021 Given   influenza virus vaccine, inactivated 12/05/2016 Recorded   pneumococcal 23-polyvalent vaccine 05/24/2016 Recorded   pneumococcal 23-polyvalent vaccine 05/24/2016 Recorded   pneumococcal 13-valent conjugate vaccine 05/15/2014 Recorded   pneumococcal 13-valent conjugate vaccine 05/15/2014 Recorded   zoster vaccine live 03/16/2012 Recorded   zoster vaccine live 03/06/2012 Recorded   tetanus/diphtheria/pertussis, acel(Tdap) 07/31/2007 Recorded    diphtheria/pertussis, acellular/tetanus 07/31/2007 Recorded   pneumococcal 23-polyvalent vaccine 2006 Recorded   Health Maintenance  Health Maintenance  ???Pending?(in the next year)  ??? ??OverDue  ??? ? ? ?Influenza Vaccine due??10/01/20??and every 1??day(s)  ??? ? ? ?Advance Directive due??01/02/21??and every 1??year(s)  ??? ? ? ?Alcohol Misuse Screening due??01/02/21??and every 1??year(s)  ??? ? ? ?Cognitive Screening due??01/02/21??and every 1??year(s)  ??? ? ? ?Fall Risk Assessment due??01/02/21??and every 1??year(s)  ??? ? ? ?Functional Assessment due??01/02/21??and every 1??year(s)  ??? ??Due?  ??? ? ? ?ADL Screening due??07/20/21??and every 1??year(s)  ??? ? ? ?Aspirin Therapy for CVD Prevention due??07/20/21??and every 1??year(s)  ??? ? ? ?Asthma Management-Asthma Education due??07/20/21??and every 6??month(s)  ??? ? ? ?Asthma Management-Spirometry due??07/20/21??Variable frequency  ??? ? ? ?Asthma Management-[place] Flow due??07/20/21??Variable frequency  ??? ? ? ?Asthma Management-Written Action Plan due??07/20/21??and every 6??month(s)  ??? ? ? ?Depression Screening due??07/20/21??Unknown Frequency  ??? ? ? ?Zoster Vaccine due??07/20/21??Unknown Frequency  ??? ??Due In Future?  ??? ? ? ?Obesity Screening not due until??01/01/22??and every 1??year(s)  ???Satisfied?(in the past 1 year)  ??? ??Satisfied?  ??? ? ? ?Blood Pressure Screening on??07/20/21.??Satisfied by Estelita Gibbs LPN  ??? ? ? ?Body Mass Index Check on??07/20/21.??Satisfied by Estelita Gibbs LPN  ??? ? ? ?Diabetes Screening on??07/16/21.??Satisfied by Donnie Bates  ??? ? ? ?Lipid Screening on??07/16/21.??Satisfied by Donnie Bates  ??? ? ? ?Medicare Annual Wellness Exam on??07/20/21.??Satisfied by Miki Cadena MD  ??? ? ? ?Obesity Screening on??07/20/21.??Satisfied by Estelita Gibbs LPN  ??? ? ? ?Tetanus Vaccine on??07/20/21.??Satisfied by Dejan Nichole LPN  ?

## 2022-07-20 PROBLEM — Z00.00 MEDICARE ANNUAL WELLNESS VISIT, SUBSEQUENT: Status: ACTIVE | Noted: 2022-07-20

## 2022-10-24 PROBLEM — Z00.00 MEDICARE ANNUAL WELLNESS VISIT, SUBSEQUENT: Status: RESOLVED | Noted: 2022-07-20 | Resolved: 2022-10-24

## 2023-08-23 ENCOUNTER — HOSPITAL ENCOUNTER (EMERGENCY)
Facility: HOSPITAL | Age: 73
Discharge: HOME | End: 2023-08-24
Attending: STUDENT IN AN ORGANIZED HEALTH CARE EDUCATION/TRAINING PROGRAM
Payer: MEDICARE

## 2023-08-23 VITALS
HEIGHT: 72 IN | SYSTOLIC BLOOD PRESSURE: 144 MMHG | OXYGEN SATURATION: 100 % | TEMPERATURE: 98 F | WEIGHT: 144 LBS | RESPIRATION RATE: 16 BRPM | BODY MASS INDEX: 19.5 KG/M2 | HEART RATE: 61 BPM | DIASTOLIC BLOOD PRESSURE: 82 MMHG

## 2023-08-23 DIAGNOSIS — I49.3 PVC (PREMATURE VENTRICULAR CONTRACTION): Primary | ICD-10-CM

## 2023-08-23 DIAGNOSIS — R07.9 CHEST PAIN: ICD-10-CM

## 2023-08-23 PROCEDURE — 0240U COVID/FLU A&B PCR: CPT | Performed by: STUDENT IN AN ORGANIZED HEALTH CARE EDUCATION/TRAINING PROGRAM

## 2023-08-23 PROCEDURE — 85025 COMPLETE CBC W/AUTO DIFF WBC: CPT | Performed by: STUDENT IN AN ORGANIZED HEALTH CARE EDUCATION/TRAINING PROGRAM

## 2023-08-23 PROCEDURE — 99285 EMERGENCY DEPT VISIT HI MDM: CPT | Mod: 25

## 2023-08-23 PROCEDURE — 93010 ELECTROCARDIOGRAM REPORT: CPT | Mod: ,,, | Performed by: INTERNAL MEDICINE

## 2023-08-23 PROCEDURE — 84484 ASSAY OF TROPONIN QUANT: CPT

## 2023-08-23 PROCEDURE — 80053 COMPREHEN METABOLIC PANEL: CPT | Performed by: STUDENT IN AN ORGANIZED HEALTH CARE EDUCATION/TRAINING PROGRAM

## 2023-08-23 PROCEDURE — 93010 EKG 12-LEAD: ICD-10-PCS | Mod: ,,, | Performed by: INTERNAL MEDICINE

## 2023-08-23 PROCEDURE — 93005 ELECTROCARDIOGRAM TRACING: CPT

## 2023-08-24 LAB
ALBUMIN SERPL-MCNC: 4 G/DL (ref 3.4–4.8)
ALBUMIN/GLOB SERPL: 1.5 RATIO (ref 1.1–2)
ALP SERPL-CCNC: 82 UNIT/L (ref 40–150)
ALT SERPL-CCNC: 27 UNIT/L (ref 0–55)
AST SERPL-CCNC: 23 UNIT/L (ref 5–34)
BASOPHILS # BLD AUTO: 0.03 X10(3)/MCL
BASOPHILS NFR BLD AUTO: 0.4 %
BILIRUB SERPL-MCNC: 0.2 MG/DL
BUN SERPL-MCNC: 28.1 MG/DL (ref 8.4–25.7)
CALCIUM SERPL-MCNC: 9.7 MG/DL (ref 8.8–10)
CHLORIDE SERPL-SCNC: 103 MMOL/L (ref 98–107)
CO2 SERPL-SCNC: 26 MMOL/L (ref 23–31)
CREAT SERPL-MCNC: 1.21 MG/DL (ref 0.73–1.18)
EOSINOPHIL # BLD AUTO: 0.17 X10(3)/MCL (ref 0–0.9)
EOSINOPHIL NFR BLD AUTO: 2.4 %
ERYTHROCYTE [DISTWIDTH] IN BLOOD BY AUTOMATED COUNT: 13.6 % (ref 11.5–17)
FLUAV AG UPPER RESP QL IA.RAPID: NOT DETECTED
FLUBV AG UPPER RESP QL IA.RAPID: NOT DETECTED
GFR SERPLBLD CREATININE-BSD FMLA CKD-EPI: >60 MLS/MIN/1.73/M2
GLOBULIN SER-MCNC: 2.7 GM/DL (ref 2.4–3.5)
GLUCOSE SERPL-MCNC: 99 MG/DL (ref 82–115)
HCT VFR BLD AUTO: 38.6 % (ref 42–52)
HGB BLD-MCNC: 12.1 G/DL (ref 14–18)
IMM GRANULOCYTES # BLD AUTO: 0.01 X10(3)/MCL (ref 0–0.04)
IMM GRANULOCYTES NFR BLD AUTO: 0.1 %
LYMPHOCYTES # BLD AUTO: 3.47 X10(3)/MCL (ref 0.6–4.6)
LYMPHOCYTES NFR BLD AUTO: 49.3 %
MCH RBC QN AUTO: 27.6 PG (ref 27–31)
MCHC RBC AUTO-ENTMCNC: 31.3 G/DL (ref 33–36)
MCV RBC AUTO: 87.9 FL (ref 80–94)
MONOCYTES # BLD AUTO: 0.52 X10(3)/MCL (ref 0.1–1.3)
MONOCYTES NFR BLD AUTO: 7.4 %
NEUTROPHILS # BLD AUTO: 2.84 X10(3)/MCL (ref 2.1–9.2)
NEUTROPHILS NFR BLD AUTO: 40.4 %
PLATELET # BLD AUTO: 193 X10(3)/MCL (ref 130–400)
PMV BLD AUTO: 10.9 FL (ref 7.4–10.4)
POC CARDIAC TROPONIN I: 0 NG/ML (ref 0–0.08)
POTASSIUM SERPL-SCNC: 4.4 MMOL/L (ref 3.5–5.1)
PROT SERPL-MCNC: 6.7 GM/DL (ref 5.8–7.6)
RBC # BLD AUTO: 4.39 X10(6)/MCL (ref 4.7–6.1)
SAMPLE: NORMAL
SARS-COV-2 RNA RESP QL NAA+PROBE: NOT DETECTED
SODIUM SERPL-SCNC: 140 MMOL/L (ref 136–145)
WBC # SPEC AUTO: 7.04 X10(3)/MCL (ref 4.5–11.5)

## 2023-08-24 NOTE — ED PROVIDER NOTES
"Encounter Date: 8/23/2023       History     Chief Complaint   Patient presents with    Chest Pain     Patient c/o chest pain that he describe as compression/tightness that started a couple weeks ago. He reports that when he went to lay down tonight it started bothering him. He denies pain, shortness of breath, or nausea at this time. He also reports it feels like his heart is skipping a beat which also started a couple weeks ago     Patient is a 73-year-old white gentleman no significant past medical history presented to the ER today due to chest discomfort and palpitations for the last 1 month.  Patient states he has been having these symptoms off and on and states that he attributed them being due to him taking too many "vitamin. "Patient states that he stop taking the vitamins and the palpitations continued.  Patient unsure of the chest pain is associated with palpitations.  He states he has no pain he just feels tightness "off and on. "He denies that any of it is worse with exertion or relieved with rest.  He denies any shortness of breath, nausea, vomiting, diaphoresis.  He denies any significant family cardiac history.  He denies any other complaints.      Review of patient's allergies indicates:  No Known Allergies  Past Medical History:   Diagnosis Date    Asthma     Chronic anemia     RBBB (right bundle branch block)      Past Surgical History:   Procedure Laterality Date    BUNIONECTOMY      COLONOSCOPY  2016    Dupluytren's Contracture surgery      SINUS SURGERY  2021    UMBILICAL HERNIA REPAIR      VASECTOMY       Family History   Problem Relation Age of Onset    Breast cancer Mother     Thyroid disease Mother     Coronary artery disease Father     Prostate cancer Father     Stroke Father     Heart murmur Brother     Hypertension Brother     Prostate cancer Brother     Thyroid disease Brother      Social History     Tobacco Use    Smoking status: Never    Smokeless tobacco: Never   Substance Use Topics "    Alcohol use: Never    Drug use: Never     Review of Systems   Constitutional:  Negative for chills, fatigue and fever.   HENT:  Negative for congestion, sore throat and trouble swallowing.    Eyes:  Negative for pain and visual disturbance.   Respiratory:  Negative for cough, shortness of breath and wheezing.    Cardiovascular:  Positive for palpitations. Negative for chest pain and leg swelling.   Gastrointestinal:  Negative for abdominal pain, blood in stool, constipation, diarrhea, nausea and vomiting.   Genitourinary:  Negative for dysuria and hematuria.   Musculoskeletal:  Negative for back pain and myalgias.   Skin:  Negative for rash and wound.   Neurological:  Negative for seizures, syncope and headaches.   Psychiatric/Behavioral:  Negative for confusion. The patient is not nervous/anxious.        Physical Exam     Initial Vitals [08/23/23 2330]   BP Pulse Resp Temp SpO2   (!) 144/82 61 16 97.8 °F (36.6 °C) 100 %      MAP       --         Physical Exam    Nursing note and vitals reviewed.  Constitutional: He appears well-developed and well-nourished. No distress.   HENT:   Head: Normocephalic and atraumatic.   Eyes: Conjunctivae and EOM are normal. Right eye exhibits no discharge. Left eye exhibits no discharge. No scleral icterus.   Neck: No tracheal deviation present.   Normal range of motion.  Cardiovascular:  Normal rate, regular rhythm and normal heart sounds.     Exam reveals no gallop and no friction rub.       No murmur heard.  Pulmonary/Chest: Breath sounds normal. No respiratory distress. He has no wheezes. He has no rhonchi. He has no rales.   Abdominal: Abdomen is soft. He exhibits no distension. There is no abdominal tenderness. There is no rebound and no guarding.   Musculoskeletal:         General: No edema. Normal range of motion.      Cervical back: Normal range of motion.     Neurological: He is alert.   Skin: Skin is warm and dry. No rash and no abscess noted. No erythema. No pallor.    Psychiatric: His behavior is normal. Judgment normal.         ED Course   Procedures  Labs Reviewed   COMPREHENSIVE METABOLIC PANEL - Abnormal; Notable for the following components:       Result Value    Blood Urea Nitrogen 28.1 (*)     Creatinine 1.21 (*)     All other components within normal limits   CBC WITH DIFFERENTIAL - Abnormal; Notable for the following components:    RBC 4.39 (*)     Hgb 12.1 (*)     Hct 38.6 (*)     MCHC 31.3 (*)     MPV 10.9 (*)     All other components within normal limits   COVID/FLU A&B PCR - Normal    Narrative:     The Xpert Xpress SARS-CoV-2/FLU/RSV plus is a rapid, multiplexed real-time PCR test intended for the simultaneous qualitative detection and differentiation of SARS-CoV-2, Influenza A, Influenza B, and respiratory syncytial virus (RSV) viral RNA in either nasopharyngeal swab or nasal swab specimens.         CBC W/ AUTO DIFFERENTIAL    Narrative:     The following orders were created for panel order CBC Auto Differential.  Procedure                               Abnormality         Status                     ---------                               -----------         ------                     CBC with Differential[[id number]]        Abnormal            Final result                 Please view results for these tests on the individual orders.   TROPONIN ISTAT   POCT TROPONIN     EKG Readings: (Independently Interpreted)   Initial Reading: No STEMI. Rhythm: Normal Sinus Rhythm. Heart Rate: 61. Ectopy: No Ectopy. Conduction: Normal. ST Segments: Normal ST Segments. T Waves: Normal. Axis: Normal.       Imaging Results              X-Ray Chest 1 View (In process)                      Medications - No data to display  Medical Decision Making  Differentials: ACS, dysrhythmia, costochondritis, GERD, anxiety, PE  73-year-old male no significant past medical history presents with palpitations as predominating symptom associated with some chest discomfort but denies pain.  Denies  "any other typical symptoms of ACS.  EKG showed no ischemic changes.  Telemetry monitor did not know 1 PVC approximately every minute.  I suspect this is likely what patient is feeling.  A PVC did occur while I was in the room patient states "that is the feeling of been feeling. "Patient does drink caffeine daily this may be a risk factor.  Troponin negative, basic labs unremarkable and chest x-ray unremarkable.  Heart score of 2 wells score 0.  Conveyed the results with the patient advise him drank last caffeine and follow up with his PCP to see if the cardiology workup is warranted.  I do think patient would likely benefit from a stress test and an echocardiogram.  All questions were answered in layman's terms and return precautions discussed    Amount and/or Complexity of Data Reviewed  Labs: ordered.  Radiology: ordered and independent interpretation performed. Decision-making details documented in ED Course.  ECG/medicine tests: ordered and independent interpretation performed. Decision-making details documented in ED Course.      Additional MDM:   PERC Rule:   Age is greater than or equal to 50 = 1.0  Heart Rate is greater than or equal to 100 = 0.0  SaO2 on room air < 95% = 0.0  Unilateral leg swelling = 0.0  Hemoptysis = 0.0  Recent surgery or trauma = 0.0  Prior PE or DVT =  0.0  Hormone use = 0.00  PERC Score = 1        Well's Criteria Score:  -Clinical symptoms of DVT (leg swelling, pain with palpation) = 0.0  -PE is #1 diagnosis OR equally likely =            0.0  -Heart Rate >100 =   0.0  -Immobilization (= or > than 3 days) or surgery in the previous 4 weeks = 0.0  -Previous DVT/PE = 0.0  -Hemoptysis =          0.0  -Malignancy =           0.0  Well's Probability Score =    0      Heart Score:    History:          Slightly suspicious.  ECG:             Normal  Age:               >65 years  Risk factors: no risk factors known  Troponin:       Less than or equal to normal limit  Heart Score = 2            "                     Clinical Impression:   Final diagnoses:  [R07.9] Chest pain  [I49.3] PVC (premature ventricular contraction) (Primary)        ED Disposition Condition    Discharge Stable          ED Prescriptions    None       Follow-up Information       Follow up With Specialties Details Why Contact Info    [facility] - Emergency Dept Emergency Medicine  If symptoms worsen [address] [facility] [id number] [phone]    Miki Cadena MD Family Medicine Schedule an appointment as soon as possible for a visit   [address] [place] [address].  [facility] [address]  [phone]               Rell Michel MD  08/24/23 0151

## 2023-08-25 PROBLEM — Z12.5 SCREENING FOR PROSTATE CANCER: Status: ACTIVE | Noted: 2023-08-25

## 2023-08-25 PROBLEM — R53.83 FATIGUE: Status: ACTIVE | Noted: 2023-08-25

## 2023-08-25 PROBLEM — E78.00 HYPERCHOLESTEROLEMIA: Status: ACTIVE | Noted: 2023-08-25

## 2023-08-25 PROCEDURE — 82607 VITAMIN B-12: CPT | Performed by: FAMILY MEDICINE

## 2023-08-30 PROBLEM — R79.89 ELEVATED SERUM CREATININE: Status: ACTIVE | Noted: 2023-08-30

## 2023-08-30 PROBLEM — R79.89 HIGH SERUM VITAMIN D: Status: ACTIVE | Noted: 2023-08-30

## 2023-08-30 PROCEDURE — 85060 BLOOD SMEAR INTERPRETATION: CPT | Performed by: FAMILY MEDICINE

## 2023-08-30 PROCEDURE — 83550 IRON BINDING TEST: CPT | Performed by: FAMILY MEDICINE

## 2023-08-30 PROCEDURE — 82728 ASSAY OF FERRITIN: CPT | Performed by: FAMILY MEDICINE

## 2023-11-16 PROCEDURE — 85045 AUTOMATED RETICULOCYTE COUNT: CPT | Performed by: FAMILY MEDICINE

## 2023-12-04 PROBLEM — Z00.00 MEDICARE ANNUAL WELLNESS VISIT, SUBSEQUENT: Status: RESOLVED | Noted: 2022-07-20 | Resolved: 2023-12-04

## 2024-02-02 PROBLEM — E78.2 MODERATE MIXED HYPERLIPIDEMIA NOT REQUIRING STATIN THERAPY: Status: ACTIVE | Noted: 2024-02-02

## 2024-04-21 ENCOUNTER — HOSPITAL ENCOUNTER (EMERGENCY)
Facility: HOSPITAL | Age: 74
Discharge: HOME | End: 2024-04-21
Attending: EMERGENCY MEDICINE
Payer: MEDICARE

## 2024-04-21 VITALS
WEIGHT: 140 LBS | RESPIRATION RATE: 18 BRPM | SYSTOLIC BLOOD PRESSURE: 126 MMHG | HEIGHT: 71 IN | OXYGEN SATURATION: 99 % | DIASTOLIC BLOOD PRESSURE: 80 MMHG | BODY MASS INDEX: 19.6 KG/M2 | HEART RATE: 72 BPM | TEMPERATURE: 98 F
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
## 2024-04-21 PROCEDURE — 99283 EMERGENCY DEPT VISIT LOW MDM: CPT

## 2024-04-21 RX ORDER — CEPHALEXIN 500 MG/1
500 CAPSULE ORAL EVERY 8 HOURS
Qty: 30 CAPSULE | Refills: 0 | Status: SHIPPED | OUTPATIENT
Start: 2024-04-21 | End: 2024-05-01

## 2024-04-21 NOTE — DISCHARGE INSTRUCTIONS
Take Claritin 1 tablet twice a day for 5 days.  Take Pepcid 1 tablet twice a day for 5 days.  This is over-the-counter.  You may apply hydrocortisone cream twice a day.  Take the Keflex 1 tablet 3 times a day for 10 days.  Follow up with your doctor in 3-4 days for recheck.  Return to the ER for worsening symptoms or condition.

## 2024-04-21 NOTE — ED PROVIDER NOTES
Encounter Date: 4/21/2024       History     Chief Complaint   Patient presents with    Insect Bite     Pt went camping this weekend. He was bitten by insects. He thinks it was nats. He is worried it could be a spider. His left hand is swollen today. Normal pulse.      Patient presents to ER today with a complaint of multiple insect bites.  Patient is concerned the 1 on his hand might be becoming infected.  Patient is complaining of redness and swelling to his left hand.        Review of patient's allergies indicates:  No Known Allergies  Past Medical History:   Diagnosis Date    Chronic anemia     RBBB (right bundle branch block)      Past Surgical History:   Procedure Laterality Date    BUNIONECTOMY      COLONOSCOPY  2016    Dupluytren's Contracture surgery      SINUS SURGERY  2021    UMBILICAL HERNIA REPAIR      VASECTOMY       Family History   Problem Relation Name Age of Onset    Breast cancer Mother      Thyroid disease Mother      Coronary artery disease Father      Prostate cancer Father      Stroke Father      Heart murmur Brother      Hypertension Brother      Prostate cancer Brother      Thyroid disease Brother       Social History     Tobacco Use    Smoking status: Never    Smokeless tobacco: Never   Substance Use Topics    Alcohol use: Never    Drug use: Never     Review of Systems   Skin:         Multiple insect bites, redness and swelling noted to left hand   All other systems reviewed and are negative.      Physical Exam     Initial Vitals [04/21/24 1401]   BP Pulse Resp Temp SpO2   126/80 72 18 97.7 °F (36.5 °C) 99 %      MAP       --         Physical Exam    Vitals reviewed.  Constitutional: He appears well-developed and well-nourished.   HENT:   Head: Normocephalic and atraumatic.   Eyes: EOM are normal. Pupils are equal, round, and reactive to light.   Neck: Neck supple.   Normal range of motion.  Cardiovascular:  Normal rate, regular rhythm and intact distal pulses.           Pulmonary/Chest:  Breath sounds normal.   Musculoskeletal:         General: Normal range of motion.      Cervical back: Normal range of motion and neck supple.      Comments: Left hand is some erythema and swelling around the base of his thumb to his wrist.  There is also swelling and redness noted to the 2nd carpal head. there is no open wounds or rashes.  Radial pulse 2 +.  Ulnar pulse 2 +.  Nail beds pink.  Neuro intact.  Patient able to open and close his hand without any difficulty.     Neurological: He is alert and oriented to person, place, and time. He has normal strength.   Skin: Skin is warm and dry. Capillary refill takes less than 2 seconds.   Psychiatric: He has a normal mood and affect. His behavior is normal. Judgment and thought content normal.         ED Course   Procedures  Labs Reviewed - No data to display       Imaging Results    None          Medications - No data to display  Medical Decision Making  Insect bite, allergic reaction, cellulitis    Risk  Prescription drug management.  Risk Details: I advised patient I feel he is starting to develop a little cellulitis around the insect bite.  He may take Claritin twice a day for 5 days.  Pepcid twice a day for 5 days.  The Keflex 3 times a day for 10 days.  He may apply hydrocortisone cream twice a day.  He is to follow up with his primary care doctor in 3-4 days.  Return sooner if needed.  Patient verbalized understanding and agrees to treatment plan                                      Clinical Impression:  Final diagnoses:  [W57.XXXA] Bug bite with infection, initial encounter (Primary)          ED Disposition Condition    Discharge Stable          ED Prescriptions       Medication Sig Dispense Start Date End Date Auth. Provider    cephALEXin (KEFLEX) 500 MG capsule Take 1 capsule (500 mg total) by mouth every 8 (eight) hours. for 10 days 30 capsule 4/21/2024 5/1/2024 Milla Jeffers PA          Follow-up Information       Follow up With Specialties Details Why  Contact Info    Miki Cadena MD Family Medicine  3-4 day [address]  [facility] [address]  [phone]               Milla Jeffers PA  04/21/24 2413

## 2024-08-10 ENCOUNTER — PATIENT MESSAGE (OUTPATIENT)
Dept: PRIMARY CARE CLINIC | Facility: CLINIC | Age: 74
End: 2024-08-10
Payer: MEDICARE

## 2024-08-23 ENCOUNTER — TELEPHONE (OUTPATIENT)
Dept: PRIMARY CARE CLINIC | Facility: CLINIC | Age: 74
End: 2024-08-23
Payer: MEDICARE

## 2024-08-23 DIAGNOSIS — Z12.5 SCREENING FOR PROSTATE CANCER: ICD-10-CM

## 2024-08-23 DIAGNOSIS — E78.2 MODERATE MIXED HYPERLIPIDEMIA NOT REQUIRING STATIN THERAPY: ICD-10-CM

## 2024-08-23 DIAGNOSIS — R53.83 FATIGUE, UNSPECIFIED TYPE: ICD-10-CM

## 2024-08-23 DIAGNOSIS — Z00.00 MEDICARE ANNUAL WELLNESS VISIT, SUBSEQUENT: Primary | ICD-10-CM

## 2024-08-23 DIAGNOSIS — R73.02 IGT (IMPAIRED GLUCOSE TOLERANCE): ICD-10-CM

## 2024-08-23 DIAGNOSIS — D64.9 CHRONIC ANEMIA: ICD-10-CM

## 2024-08-23 NOTE — TELEPHONE ENCOUNTER
----- Message from Sade Patten sent at 8/23/2024 10:22 AM CDT -----  Regarding: Patient Return call  .Who Called: Ramin Kelly    Patient is returning phone call    Who Left Message for Patient:Zhane  Does the patient know what this is regarding?:pt have concern about ordering additional lab orders, he stated that he's have plans on going get labs done on 08/24 and would like the orders placed in his chart.    Preferred Method of Contact: Phone Call  Patient's Preferred Phone Number on File: [phone]   Best Call Back Number, if different:  Additional Information: I did explained to patient the reason of the call but he stated he still needs to speak with Zhane, please call

## 2024-08-23 NOTE — TELEPHONE ENCOUNTER
----- Message from Mandy Rooney sent at 8/23/2024  8:00 AM CDT -----  Regarding: labs  .Caller is requesting to schedule their Lab appointment prior to annual appointment.    Name of Caller:pt    Preferred Date and Time of Labs:    Date of EPP Appointment:9/9    Where would they like the lab performed?n/a    Would the patient rather a call back or a response via [facility]?     Best Call Back Number: [phone]    Additional Information:call when orders are active

## 2024-08-23 NOTE — TELEPHONE ENCOUNTER
Called left VM concerning lab orders. Instructed to do fasting labs 1 week prior wellness appt. Will remind pt with confirmation call

## 2024-08-23 NOTE — TELEPHONE ENCOUNTER
Spoke with pt. Patient states he left a message in portal concerning additional labs to wellness labs? Please advise

## 2024-08-26 DIAGNOSIS — R53.82 CHRONIC FATIGUE: Primary | ICD-10-CM

## 2024-08-26 DIAGNOSIS — E55.9 VITAMIN D DEFICIENCY: ICD-10-CM

## 2024-08-29 ENCOUNTER — LAB VISIT (OUTPATIENT)
Dept: LAB | Facility: HOSPITAL | Age: 74
End: 2024-08-29
Attending: FAMILY MEDICINE
Payer: MEDICARE

## 2024-08-29 DIAGNOSIS — E78.2 MODERATE MIXED HYPERLIPIDEMIA NOT REQUIRING STATIN THERAPY: ICD-10-CM

## 2024-08-29 DIAGNOSIS — R53.82 CHRONIC FATIGUE: ICD-10-CM

## 2024-08-29 DIAGNOSIS — R73.02 IGT (IMPAIRED GLUCOSE TOLERANCE): ICD-10-CM

## 2024-08-29 DIAGNOSIS — E55.9 VITAMIN D DEFICIENCY: ICD-10-CM

## 2024-08-29 DIAGNOSIS — Z12.5 SCREENING FOR PROSTATE CANCER: ICD-10-CM

## 2024-08-29 DIAGNOSIS — Z00.00 MEDICARE ANNUAL WELLNESS VISIT, SUBSEQUENT: ICD-10-CM

## 2024-08-29 DIAGNOSIS — R53.83 FATIGUE, UNSPECIFIED TYPE: ICD-10-CM

## 2024-08-29 LAB
25(OH)D3+25(OH)D2 SERPL-MCNC: 124 NG/ML (ref 30–80)
ALBUMIN SERPL-MCNC: 4.2 G/DL (ref 3.4–4.8)
ALBUMIN/GLOB SERPL: 1.7 RATIO (ref 1.1–2)
ALP SERPL-CCNC: 90 UNIT/L (ref 40–150)
ALT SERPL-CCNC: 26 UNIT/L (ref 0–55)
ANION GAP SERPL CALC-SCNC: 10 MEQ/L
AST SERPL-CCNC: 25 UNIT/L (ref 5–34)
BACTERIA #/AREA URNS AUTO: NORMAL /HPF
BASOPHILS # BLD AUTO: 0.04 X10(3)/MCL
BASOPHILS NFR BLD AUTO: 0.5 %
BILIRUB SERPL-MCNC: 0.6 MG/DL
BILIRUB UR QL STRIP.AUTO: NEGATIVE
BUN SERPL-MCNC: 27.3 MG/DL (ref 8.4–25.7)
CALCIUM SERPL-MCNC: 10 MG/DL (ref 8.8–10)
CHLORIDE SERPL-SCNC: 102 MMOL/L (ref 98–107)
CHOLEST SERPL-MCNC: 286 MG/DL
CHOLEST/HDLC SERPL: 3 {RATIO} (ref 0–5)
CLARITY UR: CLEAR
CO2 SERPL-SCNC: 29 MMOL/L (ref 23–31)
COLOR UR AUTO: YELLOW
CREAT SERPL-MCNC: 1.13 MG/DL (ref 0.73–1.18)
CREAT/UREA NIT SERPL: 24
EOSINOPHIL # BLD AUTO: 0.12 X10(3)/MCL (ref 0–0.9)
EOSINOPHIL NFR BLD AUTO: 1.5 %
ERYTHROCYTE [DISTWIDTH] IN BLOOD BY AUTOMATED COUNT: 14.9 % (ref 11.5–17)
EST. AVERAGE GLUCOSE BLD GHB EST-MCNC: 105.4 MG/DL
GFR SERPLBLD CREATININE-BSD FMLA CKD-EPI: >60 ML/MIN/1.73/M2
GLOBULIN SER-MCNC: 2.5 GM/DL (ref 2.4–3.5)
GLUCOSE SERPL-MCNC: 97 MG/DL (ref 82–115)
GLUCOSE UR QL STRIP: NEGATIVE
HBA1C MFR BLD: 5.3 %
HCT VFR BLD AUTO: 41 % (ref 42–52)
HDLC SERPL-MCNC: 100 MG/DL (ref 35–60)
HGB BLD-MCNC: 12.9 G/DL (ref 14–18)
HGB UR QL STRIP: NEGATIVE
IMM GRANULOCYTES # BLD AUTO: 0.02 X10(3)/MCL (ref 0–0.04)
IMM GRANULOCYTES NFR BLD AUTO: 0.3 %
KETONES UR QL STRIP: NEGATIVE
LDLC SERPL CALC-MCNC: 175 MG/DL (ref 50–140)
LEUKOCYTE ESTERASE UR QL STRIP: NEGATIVE
LYMPHOCYTES # BLD AUTO: 3.97 X10(3)/MCL (ref 0.6–4.6)
LYMPHOCYTES NFR BLD AUTO: 51.2 %
MCH RBC QN AUTO: 27.7 PG (ref 27–31)
MCHC RBC AUTO-ENTMCNC: 31.5 G/DL (ref 33–36)
MCV RBC AUTO: 88 FL (ref 80–94)
MONOCYTES # BLD AUTO: 0.4 X10(3)/MCL (ref 0.1–1.3)
MONOCYTES NFR BLD AUTO: 5.2 %
NEUTROPHILS # BLD AUTO: 3.21 X10(3)/MCL (ref 2.1–9.2)
NEUTROPHILS NFR BLD AUTO: 41.3 %
NITRITE UR QL STRIP: NEGATIVE
PH UR STRIP: 5.5 [PH]
PLATELET # BLD AUTO: 200 X10(3)/MCL (ref 130–400)
PMV BLD AUTO: 10.5 FL (ref 7.4–10.4)
POTASSIUM SERPL-SCNC: 4.7 MMOL/L (ref 3.5–5.1)
PROT SERPL-MCNC: 6.7 GM/DL (ref 5.8–7.6)
PROT UR QL STRIP: NEGATIVE
PSA SERPL-MCNC: 5.36 NG/ML
RBC # BLD AUTO: 4.66 X10(6)/MCL (ref 4.7–6.1)
RBC #/AREA URNS AUTO: NORMAL /HPF
SODIUM SERPL-SCNC: 141 MMOL/L (ref 136–145)
SP GR UR STRIP.AUTO: 1.02 (ref 1–1.03)
SQUAMOUS #/AREA URNS AUTO: NORMAL /HPF
T4 FREE SERPL-MCNC: 0.8 NG/DL (ref 0.7–1.48)
TRIGL SERPL-MCNC: 54 MG/DL (ref 34–140)
TSH SERPL-ACNC: 10.84 UIU/ML (ref 0.35–4.94)
UROBILINOGEN UR STRIP-ACNC: 0.2
VIT B12 SERPL-MCNC: 928 PG/ML (ref 213–816)
VLDLC SERPL CALC-MCNC: 11 MG/DL
WBC # BLD AUTO: 7.76 X10(3)/MCL (ref 4.5–11.5)
WBC #/AREA URNS AUTO: NORMAL /HPF

## 2024-08-29 PROCEDURE — 84443 ASSAY THYROID STIM HORMONE: CPT

## 2024-08-29 PROCEDURE — 80061 LIPID PANEL: CPT

## 2024-08-29 PROCEDURE — 82607 VITAMIN B-12: CPT

## 2024-08-29 PROCEDURE — 83036 HEMOGLOBIN GLYCOSYLATED A1C: CPT

## 2024-08-29 PROCEDURE — 36415 COLL VENOUS BLD VENIPUNCTURE: CPT

## 2024-08-29 PROCEDURE — 80053 COMPREHEN METABOLIC PANEL: CPT

## 2024-08-29 PROCEDURE — 82306 VITAMIN D 25 HYDROXY: CPT

## 2024-08-29 PROCEDURE — 84153 ASSAY OF PSA TOTAL: CPT

## 2024-08-29 PROCEDURE — 84439 ASSAY OF FREE THYROXINE: CPT

## 2024-08-29 PROCEDURE — 85025 COMPLETE CBC W/AUTO DIFF WBC: CPT

## 2024-08-29 PROCEDURE — 81003 URINALYSIS AUTO W/O SCOPE: CPT

## 2024-09-05 PROBLEM — R79.89 ABNORMAL TSH: Status: ACTIVE | Noted: 2024-09-05

## 2024-09-05 NOTE — PROGRESS NOTES
..Annual Exam       HPI:     Patient presents for wellness examination.    He has been feeling well.     He has been sleeping well.    His appetite is good; eats very healthy.  He has been following a keto diet.   He does yd work and is active.    He is retired.    He lives with his spouse who is in remission for breast cancer. She has a spot on her lung. She stated chemo last week. She has dementia. They have 3 children and 5 grandchildren.    He does not smoke or drink.    He has 2-3 cups of coffee a day.  No soft drinks.    Last colonoscopy 2016: Dr Boyd.  His father and brother both had prostate cancer.       The patient's Health Maintenance was reviewed and the following appears to be due at this time:   Health Maintenance Due   Topic Date Due    Hepatitis C Screening  Never done    RSV Vaccine (Age 60+ and Pregnant patients) (1 - 1-dose 60+ series) Never done    Shingles Vaccine (2 of 3) 05/11/2012    Influenza Vaccine (1) 09/01/2024    COVID-19 Vaccine (1 - 2023-24 season) Never done       ..  Past Medical History:   Diagnosis Date    Chronic anemia     RBBB (right bundle branch block)           ..  Past Surgical History:   Procedure Laterality Date    BUNIONECTOMY      COLONOSCOPY  2016    Dupluytren's Contracture surgery      SINUS SURGERY  2021    UMBILICAL HERNIA REPAIR      VASECTOMY            Current Outpatient Medications   Medication Instructions    latanoprost 0.005 % ophthalmic solution 1 drop, Both Eyes, Nightly         ..  Social History     Socioeconomic History    Marital status:     Number of children: 3   Occupational History    Occupation: Retired   Tobacco Use    Smoking status: Never    Smokeless tobacco: Never   Substance and Sexual Activity    Alcohol use: Never    Drug use: Never     Social Determinants of Health     Financial Resource Strain: Low Risk  (9/9/2024)    Overall Financial Resource Strain (CARDIA)     Difficulty of Paying Living Expenses: Not hard at all   Food  Insecurity: No Food Insecurity (9/9/2024)    Hunger Vital Sign     Worried About Running Out of Food in the Last Year: Never true     Ran Out of Food in the Last Year: Never true   Transportation Needs: No Transportation Needs (9/9/2024)    TRANSPORTATION NEEDS     Transportation : No   Physical Activity: Insufficiently Active (9/9/2024)    Exercise Vital Sign     Days of Exercise per Week: 3 days     Minutes of Exercise per Session: 30 min   Stress: No Stress Concern Present (9/9/2024)    [employer] - Occupational Stress Questionnaire     Feeling of Stress : Not at all   Housing Stability: Low Risk  (9/9/2024)    Housing Stability Vital Sign     Unable to Pay for Housing in the Last Year: No     Homeless in the Last Year: No          ..  Family History   Problem Relation Name Age of Onset    Breast cancer Mother      Thyroid disease Mother      Coronary artery disease Father      Prostate cancer Father      Stroke Father      Heart murmur Brother      Hypertension Brother      Prostate cancer Brother      Thyroid disease Brother            ..Review of patient's allergies indicates:  No Known Allergies       ..  Immunization History   Administered Date(s) Administered    DTaP 07/31/2007    Influenza - Trivalent - PF (ADULT) 12/05/2016    Pneumococcal Conjugate - 13 Valent 05/15/2014    Pneumococcal Polysaccharide - 23 Valent 05/24/2016    Tdap 07/31/2007, 07/20/2021    Zoster 03/06/2012, 03/16/2012          REVIEW OF SYSTEMS:    GENERAL: No weight loss, no weight gain, no fever, no fatigue, no chills, no night sweats  HEENT: No sore throat, no ear pain, no sinus pressure, no nasal congestion, no rhinorrhea, + decreased hearing, no snoring  VISION: No vision changes, no blurry vision, no double vision, no glaucoma, no cataracts, + glasses, history of right Lasik surgery 25 years ago  LAST EYE EXAM: 1 month ago, has appointment tomorrow, Dr Aura Adrian  NECK: no LAD  CARDIAC: No chest  "pain, no palpitations, no dyspnea on exertion, no orthopnea  RESPIRATORY: No cough, no wheezing, no sputum production, no SOB  GI: No abdominal pain, no N/V, no heartburn,  no constipation, no diarrhea, no blood in stool, (- ) family history of Colon Ca  : No dysuria, no hematuria, no frequency, no urgency, no incontinence, no testicular pain/swelling, (+ ) family history of Prostate Ca: father and brother  MUSC/SKEL: No myalgia, no weakness, no edema, no arthralgia, no joint swelling/effusions  SKIN: No rashes, no hives, no itching, no sores  NEURO: No HA, no numbness, no tingling, no weakness, no dizziness  PSYCH: No anxiety, no depression, no irritability, no panic attacks, no s/i, no h/i, no hallucinations  ENDO: No polyuria, no polyphagia, no polydipsia  HEME: No bruising, no bleeding disorders, no signs of anemia.       PHYSICAL EXAM:    ..Visit Vitals  /74   Pulse 69   Temp 97.6 °F (36.4 °C)   Ht 5' 11" (1.803 m)   Wt 63 kg (139 lb)   SpO2 98%   BMI 19.39 kg/m²        General: Well developed, well nourished white male in no apparent distress, alert and oriented x3  Skin: No rash or abnormal lesions  HEENT: Normocephalic, PERRLA, EOMI, mouth WNL, throat WNL, nares normal, EAC and TM WNL bilaterally  Neck: FROM, no LAD, no thyroid abnormalities palpable  Chest: CTA bilaterally, no wheezes crackles or rubs  Cardiac: RRR, no murmurs, rubs, gallops  Abdomen: Soft, nontender, nondistended, NBSx4, no rebound tenderness or guarding, no HSM  Extremities: No clubbing, cyanosis, or edema. Joints WNL, +2 DP/PT pulses bilaterally  Neuro: No sensory or motor defects noted. CN II-XII intact. Gait WNL.  Genital: normal testes, no hernias  Rectal:  Deferred      1. Medicare annual wellness visit, subsequent  Overview:  Patient presents for wellness examination.    He has been feeling okay.    He went to the ER 1 week ago for chest discomfort and palpitations.  He has been having palpitations for approximately 2 " months.  His ER workup was negative for cardiac etiology.  Patient continues to remain active.    Recent labs reviewed with patient.  Last colonoscopy 2016: Dr. Boyd.    09/09/2024: Patient presents for wellness exam.    He has been feeling well.  Patient encouraged to remain physically active.  Labs reviewed with patient.  Patient declines an influenza vaccine, RSV vaccine and Shingrix vaccines.  Last colonoscopy 2016.      2. Moderate mixed hyperlipidemia not requiring statin therapy  Overview:  09/09/2024:  Total cholesterol 286, , triglycerides 54 and ; this gives patient a 22.84% risk of significant cardiovascular disease in the next 10 years.    Patient declines medication at this time; benefits and risks reviewed with patient.      3. High serum vitamin D  Overview:  His vitamin-D level was 113; patient advised to discontinue all vitamin-D supplementation.  Patient advised that this is a fat-soluble vitamin and will take a while to clear from his system.    09/09/2024: Vitamin-D level 124; patient advised to discontinue vitamin-D supplementation.  Patient denies any symptoms consistent with vitamin-D toxicity.      4. Fatigue, unspecified type  Overview:  09/09/2024:  Patient reports occasional fatigue; labs reviewed with patient.  Borderline hypothyroidism?  Patient encouraged to remain physically active.      5. Screening for prostate cancer  Overview:  Patient denies any obstructive or irritative voiding symptoms.    He denies any history of prostate cancer.    His PSA is 4.26.    His prostate exam is benign.    09/09/2024: PSA 5.36; within normal limits for age.  Patient denies any obstructive or irritative voiding symptoms.      6. Chronic anemia  Overview:  Patient continues have anemia; his current hemoglobin and hematocrit are 12.1 and 38.6.  MCV 87.9.  On 07/16/2021 his hemoglobin and hematocrit were 14.6 and 46.1.  B12 level above normal at 907.    Iron profile, ferritin,  peripheral smear and reticulocyte count pending.  Patient denies melena or hematochezia.    His last colonoscopy was in 2015.    May need further workup depending on above labs.    09/05/2024:  Patient with chronic anemia; workup has been negative.    Hemoglobin/hematocrit: 12.9/41.0.      7. Abnormal TSH  Overview:  09/09/2024:   TSH: 10.84.  Free T4 0.80.  Benefits and risks of medication reviewed with patient; patient declines medication at this time.      8. Immunization refused  Overview:  09/09/2024:   Patient declines influenza vaccine, RSV vaccine and Shingrix vaccines; benefits and risks reviewed with patient.           ..Follow up in about 1 year (around 9/9/2025) for Wellness.     Future Appointments   Date Time Provider Department Center   9/11/2025  9:00 AM Miki Cadena MD [facility]

## 2024-09-09 ENCOUNTER — OFFICE VISIT (OUTPATIENT)
Dept: PRIMARY CARE CLINIC | Facility: CLINIC | Age: 74
End: 2024-09-09
Payer: MEDICARE

## 2024-09-09 VITALS
HEIGHT: 71 IN | DIASTOLIC BLOOD PRESSURE: 74 MMHG | TEMPERATURE: 98 F | BODY MASS INDEX: 19.46 KG/M2 | OXYGEN SATURATION: 98 % | HEART RATE: 69 BPM | SYSTOLIC BLOOD PRESSURE: 130 MMHG | WEIGHT: 139 LBS

## 2024-09-09 DIAGNOSIS — Z28.21 IMMUNIZATION REFUSED: ICD-10-CM

## 2024-09-09 DIAGNOSIS — Z12.5 SCREENING FOR PROSTATE CANCER: ICD-10-CM

## 2024-09-09 DIAGNOSIS — Z00.00 MEDICARE ANNUAL WELLNESS VISIT, SUBSEQUENT: Primary | ICD-10-CM

## 2024-09-09 DIAGNOSIS — D64.9 CHRONIC ANEMIA: ICD-10-CM

## 2024-09-09 DIAGNOSIS — R53.83 FATIGUE, UNSPECIFIED TYPE: ICD-10-CM

## 2024-09-09 DIAGNOSIS — R79.89 ABNORMAL TSH: ICD-10-CM

## 2024-09-09 DIAGNOSIS — R79.89 HIGH SERUM VITAMIN D: ICD-10-CM

## 2024-09-09 DIAGNOSIS — E78.2 MODERATE MIXED HYPERLIPIDEMIA NOT REQUIRING STATIN THERAPY: ICD-10-CM

## 2024-09-09 PROCEDURE — 1101F PT FALLS ASSESS-DOCD LE1/YR: CPT | Mod: CPTII,,, | Performed by: FAMILY MEDICINE

## 2024-09-09 PROCEDURE — 3044F HG A1C LEVEL LT 7.0%: CPT | Mod: CPTII,,, | Performed by: FAMILY MEDICINE

## 2024-09-09 PROCEDURE — 3288F FALL RISK ASSESSMENT DOCD: CPT | Mod: CPTII,,, | Performed by: FAMILY MEDICINE

## 2024-09-09 PROCEDURE — G0439 PPPS, SUBSEQ VISIT: HCPCS | Mod: ,,, | Performed by: FAMILY MEDICINE

## 2024-09-09 PROCEDURE — 3075F SYST BP GE 130 - 139MM HG: CPT | Mod: CPTII,,, | Performed by: FAMILY MEDICINE

## 2024-09-09 PROCEDURE — 3078F DIAST BP <80 MM HG: CPT | Mod: CPTII,,, | Performed by: FAMILY MEDICINE

## 2024-09-09 PROCEDURE — 1126F AMNT PAIN NOTED NONE PRSNT: CPT | Mod: CPTII,,, | Performed by: FAMILY MEDICINE

## 2024-09-09 RX ORDER — LATANOPROST 50 UG/ML
1 SOLUTION/ DROPS OPHTHALMIC NIGHTLY
COMMUNITY
Start: 2024-08-30

## 2025-04-12 ENCOUNTER — HOSPITAL ENCOUNTER (EMERGENCY)
Facility: HOSPITAL | Age: 75
Discharge: HOME | End: 2025-04-13
Attending: SPECIALIST
Payer: MEDICARE

## 2025-04-12 DIAGNOSIS — R07.9 NONSPECIFIC CHEST PAIN: ICD-10-CM

## 2025-04-12 DIAGNOSIS — D64.9 CHRONIC ANEMIA: Primary | ICD-10-CM

## 2025-04-12 DIAGNOSIS — R07.9 CHEST PAIN: ICD-10-CM

## 2025-04-12 LAB
ALBUMIN SERPL-MCNC: 3.9 G/DL (ref 3.4–4.8)
ALBUMIN/GLOB SERPL: 1.3 RATIO (ref 1.1–2)
ALP SERPL-CCNC: 91 UNIT/L (ref 40–150)
ALT SERPL-CCNC: 25 UNIT/L (ref 0–55)
ANION GAP SERPL CALC-SCNC: 8 MEQ/L
AST SERPL-CCNC: 23 UNIT/L (ref 11–45)
BASOPHILS # BLD AUTO: 0.04 X10(3)/MCL
BASOPHILS NFR BLD AUTO: 0.5 %
BILIRUB SERPL-MCNC: 0.2 MG/DL
BUN SERPL-MCNC: 27.9 MG/DL (ref 8.4–25.7)
CALCIUM SERPL-MCNC: 9.5 MG/DL (ref 8.8–10)
CHLORIDE SERPL-SCNC: 104 MMOL/L (ref 98–107)
CO2 SERPL-SCNC: 27 MMOL/L (ref 23–31)
CREAT SERPL-MCNC: 1.05 MG/DL (ref 0.72–1.25)
CREAT/UREA NIT SERPL: 27
EOSINOPHIL # BLD AUTO: 0.24 X10(3)/MCL (ref 0–0.9)
EOSINOPHIL NFR BLD AUTO: 2.7 %
ERYTHROCYTE [DISTWIDTH] IN BLOOD BY AUTOMATED COUNT: 14.3 % (ref 11.5–17)
GFR SERPLBLD CREATININE-BSD FMLA CKD-EPI: >60 ML/MIN/1.73/M2
GLOBULIN SER-MCNC: 2.9 GM/DL (ref 2.4–3.5)
GLUCOSE SERPL-MCNC: 106 MG/DL (ref 82–115)
HCT VFR BLD AUTO: 38.5 % (ref 42–52)
HGB BLD-MCNC: 12.3 G/DL (ref 14–18)
IMM GRANULOCYTES # BLD AUTO: 0.03 X10(3)/MCL (ref 0–0.04)
IMM GRANULOCYTES NFR BLD AUTO: 0.3 %
LIPASE SERPL-CCNC: 39 U/L
LYMPHOCYTES # BLD AUTO: 3.76 X10(3)/MCL (ref 0.6–4.6)
LYMPHOCYTES NFR BLD AUTO: 42.8 %
MCH RBC QN AUTO: 27.9 PG (ref 27–31)
MCHC RBC AUTO-ENTMCNC: 31.9 G/DL (ref 33–36)
MCV RBC AUTO: 87.3 FL (ref 80–94)
MONOCYTES # BLD AUTO: 0.42 X10(3)/MCL (ref 0.1–1.3)
MONOCYTES NFR BLD AUTO: 4.8 %
NEUTROPHILS # BLD AUTO: 4.29 X10(3)/MCL (ref 2.1–9.2)
NEUTROPHILS NFR BLD AUTO: 48.9 %
PLATELET # BLD AUTO: 199 X10(3)/MCL (ref 130–400)
PMV BLD AUTO: 10.1 FL (ref 7.4–10.4)
POTASSIUM SERPL-SCNC: 4.4 MMOL/L (ref 3.5–5.1)
PROT SERPL-MCNC: 6.8 GM/DL (ref 5.8–7.6)
RBC # BLD AUTO: 4.41 X10(6)/MCL (ref 4.7–6.1)
SODIUM SERPL-SCNC: 139 MMOL/L (ref 136–145)
TROPONIN I SERPL-MCNC: <0.01 NG/ML (ref 0–0.04)
WBC # BLD AUTO: 8.78 X10(3)/MCL (ref 4.5–11.5)

## 2025-04-12 PROCEDURE — 80053 COMPREHEN METABOLIC PANEL: CPT | Performed by: SPECIALIST

## 2025-04-12 PROCEDURE — 84484 ASSAY OF TROPONIN QUANT: CPT | Performed by: SPECIALIST

## 2025-04-12 PROCEDURE — 99284 EMERGENCY DEPT VISIT MOD MDM: CPT | Mod: 25

## 2025-04-12 PROCEDURE — 83690 ASSAY OF LIPASE: CPT | Performed by: SPECIALIST

## 2025-04-12 PROCEDURE — 93010 ELECTROCARDIOGRAM REPORT: CPT | Mod: ,,, | Performed by: STUDENT IN AN ORGANIZED HEALTH CARE EDUCATION/TRAINING PROGRAM

## 2025-04-12 PROCEDURE — 93005 ELECTROCARDIOGRAM TRACING: CPT

## 2025-04-12 PROCEDURE — 85025 COMPLETE CBC W/AUTO DIFF WBC: CPT | Performed by: SPECIALIST

## 2025-04-13 VITALS
OXYGEN SATURATION: 95 % | RESPIRATION RATE: 16 BRPM | HEIGHT: 71 IN | DIASTOLIC BLOOD PRESSURE: 82 MMHG | WEIGHT: 140 LBS | TEMPERATURE: 98 F | SYSTOLIC BLOOD PRESSURE: 129 MMHG | BODY MASS INDEX: 19.6 KG/M2 | HEART RATE: 67 BPM

## 2025-04-13 NOTE — ED PROVIDER NOTES
Encounter Date: 4/12/2025       History     Chief Complaint   Patient presents with    Chest Pain     Chest pain intermittently all day. 9pm started feeling flushed with CP and radiating to L arm. Denies nay cardiac history.      74-year-old male notes he had some chest pain that he reported as a little tightness yesterday if might have felt a little short of breath; he states it kind of came and went and then today he had it again; patient does not seem very concerned and states it is not a pain really or pressure just a little discomfort, no nausea; states he has a history of asthma in the past and routinely we will get a little short of breath or fatigued;  This evening around 900 pm he felt the same discomfort in his chest and felt it in his left shoulder, no nausea, no shortness of breath    The history is provided by the patient.     Review of patient's allergies indicates:  No Known Allergies  Past Medical History:   Diagnosis Date    Chronic anemia     RBBB (right bundle branch block)      Past Surgical History:   Procedure Laterality Date    BUNIONECTOMY      COLONOSCOPY  2016    Dupluytren's Contracture surgery      SINUS SURGERY  2021    UMBILICAL HERNIA REPAIR      VASECTOMY       Family History   Problem Relation Name Age of Onset    Breast cancer Mother      Thyroid disease Mother      Coronary artery disease Father      Prostate cancer Father      Stroke Father      Heart murmur Brother      Hypertension Brother      Prostate cancer Brother      Thyroid disease Brother       Social History[1]  Review of Systems   Constitutional: Negative.    HENT: Negative.     Respiratory: Negative.     Cardiovascular: Negative.    Gastrointestinal: Negative.    Genitourinary: Negative.    Musculoskeletal: Negative.    Neurological: Negative.        Physical Exam     Initial Vitals [04/12/25 2316]   BP Pulse Resp Temp SpO2   (!) 156/96 73 18 98.4 °F (36.9 °C) 100 %      MAP       --         Physical Exam    Nursing  note and vitals reviewed.  Constitutional: He appears well-developed and well-nourished.   HENT:   Head: Normocephalic and atraumatic. Mouth/Throat: Oropharynx is clear and moist.   Eyes: EOM are normal. Pupils are equal, round, and reactive to light.   Neck: Neck supple.   Normal range of motion.  Cardiovascular:  Normal rate, regular rhythm and normal heart sounds.           No murmur heard.  Pulmonary/Chest: Breath sounds normal.   Abdominal: Abdomen is soft. Bowel sounds are normal. There is no abdominal tenderness.   Musculoskeletal:         General: Normal range of motion.      Cervical back: Normal range of motion and neck supple.     Neurological: He is alert and oriented to person, place, and time. GCS score is 15. GCS eye subscore is 4. GCS verbal subscore is 5. GCS motor subscore is 6.   Skin: Skin is warm and dry.         ED Course   Procedures  Labs Reviewed   COMPREHENSIVE METABOLIC PANEL - Abnormal       Result Value    Sodium 139      Potassium 4.4      Chloride 104      CO2 27      Glucose 106      Blood Urea Nitrogen 27.9 (*)     Creatinine 1.05      Calcium 9.5      Protein Total 6.8      Albumin 3.9      Globulin 2.9      Albumin/Globulin Ratio 1.3      Bilirubin Total 0.2      ALP 91      ALT 25      AST 23      eGFR >60      Anion Gap 8.0      BUN/Creatinine Ratio 27     CBC WITH DIFFERENTIAL - Abnormal    WBC 8.78      RBC 4.41 (*)     Hgb 12.3 (*)     Hct 38.5 (*)     MCV 87.3      MCH 27.9      MCHC 31.9 (*)     RDW 14.3      Platelet 199      MPV 10.1      Neut % 48.9      Lymph % 42.8      Mono % 4.8      Eos % 2.7      Basophil % 0.5      Imm Grans % 0.3      Neut # 4.29      Lymph # 3.76      Mono # 0.42      Eos # 0.24      Baso # 0.04      Imm Gran # 0.03     TROPONIN I - Normal    Troponin-I <0.010     LIPASE - Normal    Lipase Level 39     CBC W/ AUTO DIFFERENTIAL    Narrative:     The following orders were created for panel order CBC auto differential.  Procedure                                Abnormality         Status                     ---------                               -----------         ------                     CBC with Differential[[id number]]       Abnormal            Final result                 Please view results for these tests on the individual orders.     EKG Readings: (Independently Interpreted)   Rhythm: Normal Sinus Rhythm. Heart Rate: 67. Ectopy: No Ectopy. Conduction: Normal. ST Segments: Normal ST Segments. T Waves: Normal. Clinical Impression: Normal Sinus Rhythm   Possible LAE       Imaging Results    None          Medications - No data to display  Medical Decision Making  74-year-old male notes he had some chest pain that he reported as a little tightness yesterday if might have felt a little short of breath; he states it kind of came and went and then today he had it again; patient does not seem very concerned and states it is not a pain really or pressure just a little discomfort, no nausea; states he has a history of asthma in the past and routinely we will get a little short of breath or fatigued;  This evening around 900 pm he felt the same discomfort in his chest and felt it in his left shoulder, no nausea, no shortness of breath    DIFFERENTIAL DIAGNOSIS- nonspecific chest pain, GERD, musculoskeletal, anxiety    Amount and/or Complexity of Data Reviewed  External Data Reviewed: labs and notes.  Labs: ordered. Decision-making details documented in ED Course.  ECG/medicine tests: ordered and independent interpretation performed. Decision-making details documented in ED Course.    Risk  Risk Details: Workup unremarkable; I encouraged patient to follow up with Cardiology but he states he just wanted to make sure his cardiac enzyme and okay; ECG also unremarkable; patient was given information encouraged follow up with Cardiology and discussed both with he and his wife               ED Course as of 04/13/25 0155   Sat Apr 12, 2025   6979 Troponin I: <0.010 [DD]  "     ED Course User Index  [DD] Mesfin Cox MD          Patient Vitals for the past 24 hrs:   BP Temp Pulse Resp SpO2 Height Weight   04/13/25 0016 -- -- 67 16 95 % -- --   04/12/25 2356 129/82 -- 70 (!) 21 96 % -- --   04/12/25 2331 (!) 136/94 -- 69 15 96 % -- --   04/12/25 2316 (!) 156/96 98.4 °F (36.9 °C) 73 18 100 % 5' 11" (1.803 m) 63.5 kg (140 lb)     The patient is resting comfortably and in no acute distress.   He states that his symptoms have improved after treatment in Emergency Department. I personally discussed his test results and treatment plan.  Gave strict ED precautions.  Specific conditions for return to the emergency department and importance of follow up with his primary care provided or the physician listed on the discharge instructions.  Patient voices understanding and agrees to the plan discussed. All patients' questions have been answered at this time.   He has remained hemodynamically stable throughout entire stay in ED and is stable for discharge home.                 Clinical Impression:  Final diagnoses:  [R07.9] Chest pain  [R07.9] Nonspecific chest pain  [D64.9] Chronic anemia (Primary)          ED Disposition Condition    Discharge Stable          ED Prescriptions    None       Follow-up Information       Follow up With Specialties Details Why Contact Info    Pam Lambert MD Cardiovascular Disease, Cardiology   [address] [facility] [address]  [phone]                 [1]   Social History  Tobacco Use    Smoking status: Never    Smokeless tobacco: Never   Substance Use Topics    Alcohol use: Never    Drug use: Never        Mesfin Cox MD  04/13/25 0155    "

## 2025-04-15 LAB
OHS QRS DURATION: 96 MS
OHS QTC CALCULATION: 403 MS

## 2025-08-17 ENCOUNTER — PATIENT MESSAGE (OUTPATIENT)
Dept: PRIMARY CARE CLINIC | Facility: CLINIC | Age: 75
End: 2025-08-17
Payer: MEDICARE

## 2025-08-18 DIAGNOSIS — R30.0 DYSURIA: ICD-10-CM

## 2025-08-18 DIAGNOSIS — D64.9 CHRONIC ANEMIA: ICD-10-CM

## 2025-08-18 DIAGNOSIS — R79.89 ABNORMAL TSH: ICD-10-CM

## 2025-08-18 DIAGNOSIS — R79.89 ELEVATED SERUM CREATININE: ICD-10-CM

## 2025-08-18 DIAGNOSIS — R53.83 FATIGUE, UNSPECIFIED TYPE: ICD-10-CM

## 2025-08-18 DIAGNOSIS — Z00.00 MEDICARE ANNUAL WELLNESS VISIT, SUBSEQUENT: Primary | ICD-10-CM

## 2025-08-18 DIAGNOSIS — Z12.5 SCREENING FOR PROSTATE CANCER: ICD-10-CM

## 2025-08-18 DIAGNOSIS — E55.9 VITAMIN D DEFICIENCY: ICD-10-CM

## 2025-08-18 DIAGNOSIS — E78.2 MODERATE MIXED HYPERLIPIDEMIA NOT REQUIRING STATIN THERAPY: ICD-10-CM

## 2025-08-18 RX ORDER — CIPROFLOXACIN 250 MG/1
250 TABLET, FILM COATED ORAL 2 TIMES DAILY
Qty: 28 TABLET | Refills: 0 | Status: ON HOLD | OUTPATIENT
Start: 2025-08-18 | End: 2025-09-01

## 2025-08-21 ENCOUNTER — HOSPITAL ENCOUNTER (EMERGENCY)
Facility: HOSPITAL | Age: 75
Discharge: ANOTHER HOSPITAL | End: 2025-08-21
Attending: EMERGENCY MEDICINE
Payer: MEDICARE

## 2025-08-21 VITALS
SYSTOLIC BLOOD PRESSURE: 107 MMHG | DIASTOLIC BLOOD PRESSURE: 72 MMHG | HEART RATE: 88 BPM | RESPIRATION RATE: 19 BRPM | BODY MASS INDEX: 20.3 KG/M2 | OXYGEN SATURATION: 97 % | TEMPERATURE: 98 F | WEIGHT: 145 LBS | HEIGHT: 71 IN

## 2025-08-21 DIAGNOSIS — N17.9 AKI (ACUTE KIDNEY INJURY): ICD-10-CM

## 2025-08-21 DIAGNOSIS — Z13.6 SCREENING FOR CARDIOVASCULAR CONDITION: ICD-10-CM

## 2025-08-21 DIAGNOSIS — A41.9 SEPSIS, DUE TO UNSPECIFIED ORGANISM, UNSPECIFIED WHETHER ACUTE ORGAN DYSFUNCTION PRESENT: Primary | ICD-10-CM

## 2025-08-21 DIAGNOSIS — N10 ACUTE PYELONEPHRITIS: ICD-10-CM

## 2025-08-21 DIAGNOSIS — E87.1 HYPONATREMIA: ICD-10-CM

## 2025-08-21 LAB
ALBUMIN SERPL-MCNC: 2.1 G/DL (ref 3.4–4.8)
ALBUMIN/GLOB SERPL: 0.5 RATIO (ref 1.1–2)
ALP SERPL-CCNC: 198 UNIT/L (ref 40–150)
ALT SERPL-CCNC: 200 UNIT/L (ref 0–55)
ANION GAP SERPL CALC-SCNC: 16 MEQ/L
AST SERPL-CCNC: 221 UNIT/L (ref 11–45)
BACTERIA #/AREA URNS AUTO: ABNORMAL /HPF
BASOPHILS # BLD AUTO: 0.02 X10(3)/MCL
BASOPHILS NFR BLD AUTO: 0.1 %
BILIRUB SERPL-MCNC: 0.9 MG/DL
BILIRUB UR QL STRIP.AUTO: NEGATIVE
BUN SERPL-MCNC: 96 MG/DL (ref 8.4–25.7)
CALCIUM SERPL-MCNC: 8.2 MG/DL (ref 8.8–10)
CHLORIDE SERPL-SCNC: 79 MMOL/L (ref 98–107)
CLARITY UR: ABNORMAL
CO2 SERPL-SCNC: 19 MMOL/L (ref 23–31)
COLOR UR AUTO: ABNORMAL
CREAT SERPL-MCNC: 7.68 MG/DL (ref 0.72–1.25)
CREAT/UREA NIT SERPL: 13
EOSINOPHIL # BLD AUTO: 0.04 X10(3)/MCL (ref 0–0.9)
EOSINOPHIL NFR BLD AUTO: 0.2 %
ERYTHROCYTE [DISTWIDTH] IN BLOOD BY AUTOMATED COUNT: 14 % (ref 11.5–17)
FLUAV AG UPPER RESP QL IA.RAPID: NOT DETECTED
FLUBV AG UPPER RESP QL IA.RAPID: NOT DETECTED
GFR SERPLBLD CREATININE-BSD FMLA CKD-EPI: 7 ML/MIN/1.73/M2
GLOBULIN SER-MCNC: 4.1 GM/DL (ref 2.4–3.5)
GLUCOSE SERPL-MCNC: 113 MG/DL (ref 82–115)
GLUCOSE UR QL STRIP: 100
HCT VFR BLD AUTO: 29.5 % (ref 42–52)
HGB BLD-MCNC: 10.8 G/DL (ref 14–18)
HGB UR QL STRIP: ABNORMAL
IMM GRANULOCYTES # BLD AUTO: 0.27 X10(3)/MCL (ref 0–0.04)
IMM GRANULOCYTES NFR BLD AUTO: 1.1 %
KETONES UR QL STRIP: NEGATIVE
LACTATE SERPL-SCNC: 1.3 MMOL/L (ref 0.5–2.2)
LEUKOCYTE ESTERASE UR QL STRIP: ABNORMAL
LYMPHOCYTES # BLD AUTO: 2.27 X10(3)/MCL (ref 0.6–4.6)
LYMPHOCYTES NFR BLD AUTO: 9.2 %
MAGNESIUM SERPL-MCNC: 2.1 MG/DL (ref 1.6–2.6)
MCH RBC QN AUTO: 27.9 PG (ref 27–31)
MCHC RBC AUTO-ENTMCNC: 36.6 G/DL (ref 33–36)
MCV RBC AUTO: 76.2 FL (ref 80–94)
MONOCYTES # BLD AUTO: 0.89 X10(3)/MCL (ref 0.1–1.3)
MONOCYTES NFR BLD AUTO: 3.6 %
NEUTROPHILS # BLD AUTO: 21.13 X10(3)/MCL (ref 2.1–9.2)
NEUTROPHILS NFR BLD AUTO: 85.8 %
NITRITE UR QL STRIP: NEGATIVE
PH UR STRIP: 8 [PH]
PLATELET # BLD AUTO: 118 X10(3)/MCL (ref 130–400)
PMV BLD AUTO: 11.9 FL (ref 7.4–10.4)
POTASSIUM SERPL-SCNC: 4.5 MMOL/L (ref 3.5–5.1)
PROT SERPL-MCNC: 6.2 GM/DL (ref 5.8–7.6)
PROT UR QL STRIP: >=300
RBC # BLD AUTO: 3.87 X10(6)/MCL (ref 4.7–6.1)
RBC #/AREA URNS AUTO: ABNORMAL /HPF
SARS-COV-2 RNA RESP QL NAA+PROBE: NOT DETECTED
SODIUM SERPL-SCNC: 114 MMOL/L (ref 136–145)
SODIUM SERPL-SCNC: 116 MMOL/L (ref 136–145)
SP GR UR STRIP.AUTO: 1.02 (ref 1–1.03)
SQUAMOUS #/AREA URNS AUTO: ABNORMAL /HPF
UROBILINOGEN UR STRIP-ACNC: 0.2
WBC # BLD AUTO: 24.62 X10(3)/MCL (ref 4.5–11.5)
WBC #/AREA URNS AUTO: ABNORMAL /HPF

## 2025-08-21 PROCEDURE — 84295 ASSAY OF SERUM SODIUM: CPT | Performed by: FAMILY MEDICINE

## 2025-08-21 PROCEDURE — 83605 ASSAY OF LACTIC ACID: CPT | Performed by: EMERGENCY MEDICINE

## 2025-08-21 PROCEDURE — 96375 TX/PRO/DX INJ NEW DRUG ADDON: CPT

## 2025-08-21 PROCEDURE — 25000003 PHARM REV CODE 250: Performed by: EMERGENCY MEDICINE

## 2025-08-21 PROCEDURE — 85025 COMPLETE CBC W/AUTO DIFF WBC: CPT | Performed by: EMERGENCY MEDICINE

## 2025-08-21 PROCEDURE — 99285 EMERGENCY DEPT VISIT HI MDM: CPT | Mod: 25

## 2025-08-21 PROCEDURE — 83735 ASSAY OF MAGNESIUM: CPT | Performed by: EMERGENCY MEDICINE

## 2025-08-21 PROCEDURE — 63600175 PHARM REV CODE 636 W HCPCS: Mod: JZ,TB | Performed by: EMERGENCY MEDICINE

## 2025-08-21 PROCEDURE — 96365 THER/PROPH/DIAG IV INF INIT: CPT

## 2025-08-21 PROCEDURE — 87186 SC STD MICRODIL/AGAR DIL: CPT | Performed by: EMERGENCY MEDICINE

## 2025-08-21 PROCEDURE — 96361 HYDRATE IV INFUSION ADD-ON: CPT

## 2025-08-21 PROCEDURE — 81003 URINALYSIS AUTO W/O SCOPE: CPT | Performed by: EMERGENCY MEDICINE

## 2025-08-21 PROCEDURE — 80053 COMPREHEN METABOLIC PANEL: CPT | Performed by: EMERGENCY MEDICINE

## 2025-08-21 PROCEDURE — 96366 THER/PROPH/DIAG IV INF ADDON: CPT

## 2025-08-21 PROCEDURE — 25000003 PHARM REV CODE 250: Performed by: FAMILY MEDICINE

## 2025-08-21 PROCEDURE — 87040 BLOOD CULTURE FOR BACTERIA: CPT | Performed by: EMERGENCY MEDICINE

## 2025-08-21 PROCEDURE — 93010 ELECTROCARDIOGRAM REPORT: CPT | Mod: ,,, | Performed by: INTERNAL MEDICINE

## 2025-08-21 PROCEDURE — 87636 SARSCOV2 & INF A&B AMP PRB: CPT | Performed by: EMERGENCY MEDICINE

## 2025-08-21 PROCEDURE — 87154 CUL TYP ID BLD PTHGN 6+ TRGT: CPT | Performed by: EMERGENCY MEDICINE

## 2025-08-21 PROCEDURE — 63600175 PHARM REV CODE 636 W HCPCS: Performed by: FAMILY MEDICINE

## 2025-08-21 PROCEDURE — 93005 ELECTROCARDIOGRAM TRACING: CPT

## 2025-08-21 RX ORDER — KETOROLAC TROMETHAMINE 30 MG/ML
15 INJECTION, SOLUTION INTRAMUSCULAR; INTRAVENOUS
Status: COMPLETED | OUTPATIENT
Start: 2025-08-21 | End: 2025-08-21

## 2025-08-21 RX ORDER — 3% SODIUM CHLORIDE 3 G/100ML
15 INJECTION, SOLUTION INTRAVENOUS ONCE
Status: DISCONTINUED | OUTPATIENT
Start: 2025-08-21 | End: 2025-08-21 | Stop reason: HOSPADM

## 2025-08-21 RX ORDER — CEFTRIAXONE 2 G/1
2 INJECTION, POWDER, FOR SOLUTION INTRAMUSCULAR; INTRAVENOUS ONCE
Status: COMPLETED | OUTPATIENT
Start: 2025-08-21 | End: 2025-08-21

## 2025-08-21 RX ADMIN — SODIUM CHLORIDE 1000 ML: 9 INJECTION, SOLUTION INTRAVENOUS at 07:08

## 2025-08-21 RX ADMIN — CEFTRIAXONE SODIUM 2 G: 2 INJECTION, POWDER, FOR SOLUTION INTRAMUSCULAR; INTRAVENOUS at 07:08

## 2025-08-21 RX ADMIN — KETOROLAC TROMETHAMINE 15 MG: 30 INJECTION, SOLUTION INTRAMUSCULAR; INTRAVENOUS at 05:08

## 2025-08-21 RX ADMIN — SODIUM CHLORIDE 1000 ML: 9 INJECTION, SOLUTION INTRAVENOUS at 05:08

## 2025-08-23 LAB
ACB COMPLEX DNA BLD POS QL NAA+NON-PROBE: NOT DETECTED
B FRAGILIS DNA BLD POS QL NAA+PROBE: NOT DETECTED
C ALBICANS DNA BLD POS QL NAA+PROBE: NOT DETECTED
C AURIS DNA BLD POS QL NAA+NON-PROBE: NOT DETECTED
C GATTII+NEOFOR DNA CSF QL NAA+NON-PROBE: NOT DETECTED
C GLABRATA DNA BLD POS QL NAA+PROBE: NOT DETECTED
C KRUSEI DNA BLD POS QL NAA+PROBE: NOT DETECTED
C PARAP DNA BLD POS QL NAA+PROBE: NOT DETECTED
C TROPICLS DNA BLD POS QL NAA+PROBE: NOT DETECTED
COLISTIN RES MCR-1 ISLT/SPM QL: NOT DETECTED
E CLOAC COMP DNA BLD POS QL NAA+PROBE: NOT DETECTED
E COLI DNA BLD POS QL NAA+PROBE: NOT DETECTED
E FAECALIS+OTHR E SP RRNA BLD POS FISH: NOT DETECTED
E FAECIUM HSP60 BLD POS QL PROBE: NOT DETECTED
ENTEROBACTERALES DNA BLD POS NAA+N-PRB: ABNORMAL
ESBL CFT TO CFT-CLAV IC RTO BD POS IMP: NOT DETECTED
GP B STREP DNA CSF QL NAA+NON-PROBE: NOT DETECTED
HAEM INFLU DNA CSF QL NAA+NON-PROBE: NOT DETECTED
IMP CARBAPENEMASE ISLT QL IA.RAPID: NOT DETECTED
K OXYTOCA OMPA BLD POS QL PROBE: NOT DETECTED
KLEBSIELLA SP DNA BLD POS QL NAA+NON-PRB: DETECTED
KLEBSIELLA SP DNA BLD POS QL NAA+NON-PRB: NOT DETECTED
KPC CARBAPENEMASE ISLT QL IA.RAPID: NOT DETECTED
L MONOCYTOG DNA CSF QL NAA+NON-PROBE: NOT DETECTED
MECA+MECC NOSE QL NAA+PROBE: ABNORMAL
MECA+MECC+MREJ ISLT/SPM QL: ABNORMAL
N MEN DNA CSF QL NAA+NON-PROBE: NOT DETECTED
NDM CARBAPENEMASE ISLT QL IA.RAPID: NOT DETECTED
OXA-48-LIKE CRBPNASE ISLT QL IA.RAPID: NOT DETECTED
P AERUGINOSA DNA BLD POS QL NAA+PROBE: NOT DETECTED
PROTEUS SP DNA BLD POS QL NAA+PROBE: NOT DETECTED
S AUREUS DNA BLD POS QL NAA+PROBE: NOT DETECTED
S ENT+BONG DNA STL QL NAA+NON-PROBE: NOT DETECTED
S EPIDERMIDIS HSP60 BLD POS QL PROBE: NOT DETECTED
S LUGDUNENSIS SODA BLD POS QL PROBE: NOT DETECTED
S MALTOPH DNA BLD POS QL NAA+PROBE: NOT DETECTED
S MARCESCENS DNA BLD POS QL NAA+PROBE: NOT DETECTED
S PNEUM DNA CSF QL NAA+NON-PROBE: NOT DETECTED
S PYOGENES HSP60 BLD POS QL PROBE: NOT DETECTED
STAPH SP TUF BLD POS QL PROBE: NOT DETECTED
STREPTOCOCCUS SP TUF BLD POS QL PROBE: NOT DETECTED
VAN(A+B+C1+C2) GENES ISLT/SPM: ABNORMAL
VIM CARBAPENEMASE ISLT QL IA.RAPID: NOT DETECTED

## 2025-08-25 LAB
BACTERIA BLD CULT: ABNORMAL
BACTERIA BLD CULT: ABNORMAL
BACTERIA UR CULT: ABNORMAL
GRAM STN SPEC: ABNORMAL
OHS QRS DURATION: 104 MS
OHS QTC CALCULATION: 432 MS

## 2025-08-26 PROBLEM — D64.9 ANEMIA: Status: ACTIVE | Noted: 2025-08-26

## 2025-08-26 PROBLEM — D69.6 THROMBOCYTOPENIA: Status: ACTIVE | Noted: 2025-08-26

## 2025-08-26 PROBLEM — A41.9 SEPSIS: Status: ACTIVE | Noted: 2025-08-26

## 2025-08-27 PROBLEM — N17.9 ACUTE KIDNEY INJURY: Status: ACTIVE | Noted: 2025-08-27
